# Patient Record
Sex: MALE | Race: WHITE | Employment: FULL TIME | URBAN - METROPOLITAN AREA
[De-identification: names, ages, dates, MRNs, and addresses within clinical notes are randomized per-mention and may not be internally consistent; named-entity substitution may affect disease eponyms.]

---

## 2017-08-10 ENCOUNTER — OFFICE VISIT (OUTPATIENT)
Dept: INTERNAL MEDICINE CLINIC | Age: 37
End: 2017-08-10

## 2017-08-10 VITALS
OXYGEN SATURATION: 97 % | HEIGHT: 70 IN | RESPIRATION RATE: 18 BRPM | SYSTOLIC BLOOD PRESSURE: 120 MMHG | WEIGHT: 255 LBS | DIASTOLIC BLOOD PRESSURE: 74 MMHG | BODY MASS INDEX: 36.51 KG/M2 | TEMPERATURE: 98 F | HEART RATE: 77 BPM

## 2017-08-10 DIAGNOSIS — F98.8 ADD (ATTENTION DEFICIT DISORDER): Primary | ICD-10-CM

## 2017-08-10 RX ORDER — LOSARTAN POTASSIUM 100 MG/1
100 TABLET ORAL
COMMUNITY
End: 2017-09-06 | Stop reason: SDUPTHER

## 2017-08-10 RX ORDER — METHYLPHENIDATE HYDROCHLORIDE 10 MG/1
10 TABLET ORAL
COMMUNITY
End: 2017-08-10 | Stop reason: SDUPTHER

## 2017-08-10 RX ORDER — METHYLPHENIDATE HYDROCHLORIDE 10 MG/1
10 TABLET ORAL 2 TIMES DAILY
Qty: 60 TAB | Refills: 0 | Status: SHIPPED | OUTPATIENT
Start: 2017-08-10 | End: 2017-09-13 | Stop reason: SDUPTHER

## 2017-08-10 RX ORDER — OMEPRAZOLE 10 MG/1
10 CAPSULE, DELAYED RELEASE ORAL
COMMUNITY
End: 2018-02-02 | Stop reason: SDUPTHER

## 2017-08-10 NOTE — PROGRESS NOTES
Chief Complaint   Patient presents with   [facility] Establish Care    Medication Evaluation    Attention Deficit Disorder    Hypertension

## 2017-08-10 NOTE — MR AVS SNAPSHOT
Visit Information Date & Time Provider Department Dept. Phone Encounter #  
 8/10/2017  3:30 PM Tam Bustos PA-C [facility] [phone] [id number] Follow-up Instructions Return in about 4 weeks (around 9/7/2017) for ADD. Upcoming Health Maintenance Date Due DTaP/Tdap/Td series (1 - Tdap) 6/6/2003 INFLUENZA AGE 9 TO ADULT 8/1/2017 Allergies as of 8/10/2017  Review Complete On: 8/10/2017 By: Key Ceballos LPN No Known Allergies Current Immunizations  Never Reviewed No immunizations on file. Not reviewed this visit You Were Diagnosed With   
  
 Codes Comments ADD (attention deficit disorder)    -  Primary ICD-10-CM: F98.8 ICD-9-CM: 314.00 Vitals BP Pulse Temp Resp Height(growth percentile) Weight(growth percentile) 120/74 (BP 1 Location: Left arm, BP Patient Position: Sitting) 77 98 °F (36.7 °C) (Oral) 18 5' 10\" (1.778 m) 255 lb (115.7 kg) SpO2 BMI Smoking Status 97% 36.59 kg/m2 Never Smoker BMI and BSA Data Body Mass Index Body Surface Area  
 36.59 kg/m 2 2.39 m 2 Your Updated Medication List  
  
   
This list is accurate as of: 8/10/17 11:59 PM.  Always use your most recent med list.  
  
  
  
  
 losartan 100 mg tablet Commonly known as:  COZAAR  
100 mg.  
  
 methylphenidate HCl 10 mg tablet Commonly known as:  RITALIN Take 1 Tab by mouth two (2) times a day. Max Daily Amount: 20 mg.  
  
 omeprazole 10 mg capsule Commonly known as:  PRILOSEC 10 mg.  
  
  
  
  
Prescriptions Printed Refills  
 methylphenidate HCl (RITALIN) 10 mg tablet 0 Sig: Take 1 Tab by mouth two (2) times a day. Max Daily Amount: 20 mg.  
 Class: Print Route: Oral  
  
Follow-up Instructions Return in about 4 weeks (around 9/7/2017) for ADD. Patient Instructions Attention Deficit Hyperactivity Disorder (ADHD) in Adults: Care Instructions Your Care Instructions Attention deficit hyperactivity disorder, or ADHD, is a condition that makes it hard to pay attention. So you may have problems when you try to focus, get organized, and finish tasks. It might make you more active than other people. Or you might do things without thinking first. 
ADHD is very common. It usually starts in early childhood. Many adults don't realize they have it until their children are diagnosed. Then they become aware of their own symptoms. Doctors don't know what causes ADHD. But it often runs in families. ADHD can be treated with medicines, behavior training, and counseling. Treatment can improve your life. Follow-up care is a key part of your treatment and safety. Be sure to make and go to all appointments, and call your doctor if you are having problems. It's also a good idea to know your test results and keep a list of the medicines you take. How can you care for yourself at home? · Learn all you can about ADHD. This will help you and your family understand it better. · Take your medicines exactly as prescribed. Call your doctor if you think you are having a problem with your medicine. You will get more details on the specific medicines your doctor prescribes. · If you miss a dose of your medicine, do not take an extra dose. · If your doctor suggests counseling, find a counselor you like and trust. Talk openly and honestly. Be willing to make some changes. · Find a support group for adults with ADHD. Talking to others with the same problems can help you feel better. It can also give you ideas about how to best cope with the condition. · Get rid of distractions at your work space. Keep your desk clean. Try not to face a window or busy hallway. · Use files, planners, and other tools to keep you organized. · Limit use of alcohol, and do not use illegal drugs. People with ADHD tend to become addicted more easily than others.  Tell your doctor if you need help to quit. Counseling, support groups, and sometimes medicines can help you stay free of alcohol or drugs. · Get at least 30 minutes of physical activity on most days of the week. Exercise has been shown to help people cope with ADHD. Walking is a good choice. You also may want to do other activities, such as running, swimming, cycling, or playing tennis or team sports. When should you call for help? Watch closely for changes in your health, and be sure to contact your doctor if: 
· You feel sad a lot or cry all the time. · You have trouble sleeping, or you sleep too much. · You find it hard to concentrate, make decisions, or remember things. · You change how you normally eat. · You feel guilty for no reason. Where can you learn more? Go to http://tay-efrain.info/. Enter B196 in the search box to learn more about \"Attention Deficit Hyperactivity Disorder (ADHD) in Adults: Care Instructions. \" Current as of: July 26, 2016 Content Version: 11.3 © 1841-5713 [employer]. Care instructions adapted under license by [employer] (which disclaims liability or warranty for this information). If you have questions about a medical condition or this instruction, always ask your healthcare professional. [address] any warranty or liability for your use of this information. Introducing [facility]! Kimberlyn Melo introduces [facility] patient portal. Now you can access parts of your medical record, email your doctor's office, and request medication refills online. 1. In your internet browser, go to [email or web address]. [employer] 2. Click on the First Time User? Click Here link in the Sign In box. You will see the New Member Sign Up page. 3. Enter your [facility] Access Code exactly as it appears below. You will not need to use this code after youve completed the sign-up process.  If you do not sign up before the expiration date, you must request a new code. · Mob.ly Access Code: [email or web address] Expires: 11/8/2017  5:09 PM 
 
4. Enter the last four digits of your Social Security Number (xxxx) and Date of Birth (mm/dd/yyyy) as indicated and click Submit. You will be taken to the next sign-up page. 5. Create a Mob.ly ID. This will be your Mob.ly login ID and cannot be changed, so think of one that is secure and easy to remember. 6. Create a Mob.ly password. You can change your password at any time. 7. Enter your Password Reset Question and Answer. This can be used at a later time if you forget your password. 8. Enter your e-mail address. You will receive e-mail notification when new information is available in [address] 9. Click Sign Up. You can now view and download portions of your medical record. 10. Click the Download Summary menu link to download a portable copy of your medical information. If you have questions, please visit the Frequently Asked Questions section of the Mob.ly website. Remember, Mob.ly is NOT to be used for urgent needs. For medical emergencies, dial 911. Now available from your iPhone and Android! Please provide this summary of care documentation to your next provider. Your primary care clinician is listed as Frances Nguyễn. If you have any questions after today's visit, please call [phone].

## 2017-08-11 NOTE — PROGRESS NOTES
HISTORY OF PRESENT ILLNESS  Brian Carias is a 30 y.o. male. HPI   Pt is here to establish care. He moved here from [place] to help care for his mom. He currently has no insurance because he has not found a job yet. He was on ritalin 10mg bid for many years and is asking for a refill. He will have previous provider note along with labs sent to us. Will give pt 1 month and then advised him will need to see records or reevaluate. Pt agrees. No Known Allergies    Current Outpatient Prescriptions   Medication Sig    omeprazole (PRILOSEC) 10 mg capsule 10 mg.    losartan (COZAAR) 100 mg tablet 100 mg.    methylphenidate HCl (RITALIN) 10 mg tablet Take 1 Tab by mouth two (2) times a day. Max Daily Amount: 20 mg. No current facility-administered medications for this visit. Review of Systems   Constitutional: Negative. Negative for chills, fever and malaise/fatigue. HENT: Negative. Negative for congestion, ear pain, sore throat and tinnitus. Eyes: Negative. Negative for blurred vision, double vision and photophobia. Respiratory: Negative. Negative for cough, shortness of breath and wheezing. Cardiovascular: Negative. Negative for chest pain, palpitations and leg swelling. Gastrointestinal: Negative. Negative for abdominal pain, heartburn, nausea and vomiting. Genitourinary: Negative. Negative for dysuria, frequency, hematuria and urgency. Musculoskeletal: Negative. Negative for back pain, joint pain, myalgias and neck pain. Skin: Negative. Negative for itching and rash. Neurological: Negative. Negative for dizziness, tingling, tremors and headaches. Psychiatric/Behavioral: Negative. Negative for depression and memory loss. The patient is not nervous/anxious and does not have insomnia.       Visit Vitals    /74 (BP 1 Location: Left arm, BP Patient Position: Sitting)    Pulse 77    Temp 98 °F (36.7 °C) (Oral)    Resp 18    Ht 5' 10\" (1.778 m)    Wt 255 lb (115.7 kg)  SpO2 97%    BMI 36.59 kg/m2       Physical Exam   Constitutional: He is oriented to person, place, and time. He appears well-developed and well-nourished. No distress. HENT:   Head: Normocephalic and atraumatic. Eyes: Pupils are equal, round, and reactive to light. Cardiovascular: Normal rate, regular rhythm and normal heart sounds. Exam reveals no gallop and no friction rub. No murmur heard. Pulmonary/Chest: Effort normal and breath sounds normal. No respiratory distress. He has no wheezes. He has no rales. Neurological: He is alert and oriented to person, place, and time. Skin: Skin is warm and dry. He is not diaphoretic. Psychiatric: He has a normal mood and affect. His behavior is normal.       ASSESSMENT and PLAN    ICD-10-CM ICD-9-CM    1. ADD (attention deficit disorder) F98.8 314.00 methylphenidate HCl (RITALIN) 10 mg tablet     Follow-up Disposition:  Return in about 4 weeks (around 9/7/2017) for ADD. Pt expressed understanding of visit summary and plans for any follow ups or referrals as well as any medications prescribed.

## 2017-08-11 NOTE — PATIENT INSTRUCTIONS
Attention Deficit Hyperactivity Disorder (ADHD) in Adults: Care Instructions  Your Care Instructions  Attention deficit hyperactivity disorder, or ADHD, is a condition that makes it hard to pay attention. So you may have problems when you try to focus, get organized, and finish tasks. It might make you more active than other people. Or you might do things without thinking first.  ADHD is very common. It usually starts in early childhood. Many adults don't realize they have it until their children are diagnosed. Then they become aware of their own symptoms. Doctors don't know what causes ADHD. But it often runs in families. ADHD can be treated with medicines, behavior training, and counseling. Treatment can improve your life. Follow-up care is a key part of your treatment and safety. Be sure to make and go to all appointments, and call your doctor if you are having problems. It's also a good idea to know your test results and keep a list of the medicines you take. How can you care for yourself at home? · Learn all you can about ADHD. This will help you and your family understand it better. · Take your medicines exactly as prescribed. Call your doctor if you think you are having a problem with your medicine. You will get more details on the specific medicines your doctor prescribes. · If you miss a dose of your medicine, do not take an extra dose. · If your doctor suggests counseling, find a counselor you like and trust. Talk openly and honestly. Be willing to make some changes. · Find a support group for adults with ADHD. Talking to others with the same problems can help you feel better. It can also give you ideas about how to best cope with the condition. · Get rid of distractions at your work space. Keep your desk clean. Try not to face a window or busy hallway. · Use files, planners, and other tools to keep you organized. · Limit use of alcohol, and do not use illegal drugs.  People with ADHD tend to become addicted more easily than others. Tell your doctor if you need help to quit. Counseling, support groups, and sometimes medicines can help you stay free of alcohol or drugs. · Get at least 30 minutes of physical activity on most days of the week. Exercise has been shown to help people cope with ADHD. Walking is a good choice. You also may want to do other activities, such as running, swimming, cycling, or playing tennis or team sports. When should you call for help? Watch closely for changes in your health, and be sure to contact your doctor if:  · You feel sad a lot or cry all the time. · You have trouble sleeping, or you sleep too much. · You find it hard to concentrate, make decisions, or remember things. · You change how you normally eat. · You feel guilty for no reason. Where can you learn more? Go to http://tay-efrain.info/. Enter B196 in the search box to learn more about \"Attention Deficit Hyperactivity Disorder (ADHD) in Adults: Care Instructions. \"  Current as of: July 26, 2016  Content Version: 11.3  © 9065-3836 [employer]. Care instructions adapted under license by [employer] (which disclaims liability or warranty for this information). If you have questions about a medical condition or this instruction, always ask your healthcare professional. [address] any warranty or liability for your use of this information.

## 2017-09-06 RX ORDER — LOSARTAN POTASSIUM 100 MG/1
100 TABLET ORAL DAILY
Qty: 30 TAB | Refills: 2 | Status: SHIPPED | OUTPATIENT
Start: 2017-09-06 | End: 2017-12-01 | Stop reason: SDUPTHER

## 2017-09-13 ENCOUNTER — OFFICE VISIT (OUTPATIENT)
Dept: INTERNAL MEDICINE CLINIC | Age: 37
End: 2017-09-13

## 2017-09-13 VITALS
HEART RATE: 76 BPM | WEIGHT: 250 LBS | BODY MASS INDEX: 35.79 KG/M2 | RESPIRATION RATE: 18 BRPM | SYSTOLIC BLOOD PRESSURE: 136 MMHG | OXYGEN SATURATION: 98 % | DIASTOLIC BLOOD PRESSURE: 84 MMHG | TEMPERATURE: 98 F | HEIGHT: 70 IN

## 2017-09-13 DIAGNOSIS — F98.8 ADD (ATTENTION DEFICIT DISORDER): ICD-10-CM

## 2017-09-13 RX ORDER — METHYLPHENIDATE HYDROCHLORIDE 10 MG/1
10 TABLET ORAL 2 TIMES DAILY
Qty: 60 TAB | Refills: 0 | Status: SHIPPED | OUTPATIENT
Start: 2017-09-13 | End: 2017-10-10 | Stop reason: SDUPTHER

## 2017-09-13 RX ORDER — METHYLPHENIDATE HYDROCHLORIDE 10 MG/1
10 TABLET ORAL 2 TIMES DAILY
Qty: 60 TAB | Refills: 0 | Status: CANCELLED | OUTPATIENT
Start: 2017-09-13

## 2017-09-13 NOTE — TELEPHONE ENCOUNTER
Will refill for 1 month to allow pt to get insurance but must be seen for any more. Can you also check his BP before we give him script?

## 2017-10-10 ENCOUNTER — OFFICE VISIT (OUTPATIENT)
Dept: INTERNAL MEDICINE CLINIC | Age: 37
End: 2017-10-10

## 2017-10-10 VITALS
RESPIRATION RATE: 18 BRPM | DIASTOLIC BLOOD PRESSURE: 88 MMHG | TEMPERATURE: 98.4 F | HEART RATE: 70 BPM | SYSTOLIC BLOOD PRESSURE: 133 MMHG | WEIGHT: 256 LBS | OXYGEN SATURATION: 98 % | BODY MASS INDEX: 36.65 KG/M2 | HEIGHT: 70 IN

## 2017-10-10 DIAGNOSIS — F98.8 ATTENTION DEFICIT DISORDER (ADD) WITHOUT HYPERACTIVITY: Primary | ICD-10-CM

## 2017-10-10 DIAGNOSIS — F51.04 PSYCHOPHYSIOLOGICAL INSOMNIA: ICD-10-CM

## 2017-10-10 RX ORDER — METHYLPHENIDATE HYDROCHLORIDE 10 MG/1
10 TABLET ORAL 3 TIMES DAILY
Qty: 90 TAB | Refills: 0 | Status: SHIPPED | OUTPATIENT
Start: 2017-10-10 | End: 2017-12-26 | Stop reason: SDUPTHER

## 2017-10-10 RX ORDER — TEMAZEPAM 7.5 MG/1
7.5 CAPSULE ORAL
Qty: 30 CAP | Refills: 0 | Status: SHIPPED | OUTPATIENT
Start: 2017-10-10 | End: 2017-12-26 | Stop reason: SDUPTHER

## 2017-10-10 RX ORDER — METHYLPHENIDATE HYDROCHLORIDE 10 MG/1
10 TABLET ORAL 3 TIMES DAILY
Qty: 90 TAB | Refills: 0 | Status: SHIPPED | OUTPATIENT
Start: 2017-11-07 | End: 2017-12-26 | Stop reason: SDUPTHER

## 2017-10-10 NOTE — PATIENT INSTRUCTIONS
Attention Deficit Hyperactivity Disorder (ADHD) in Adults: Care Instructions  Your Care Instructions  Attention deficit hyperactivity disorder, or ADHD, is a condition that makes it hard to pay attention. So you may have problems when you try to focus, get organized, and finish tasks. It might make you more active than other people. Or you might do things without thinking first.  ADHD is very common. It usually starts in early childhood. Many adults don't realize they have it until their children are diagnosed. Then they become aware of their own symptoms. Doctors don't know what causes ADHD. But it often runs in families. ADHD can be treated with medicines, behavior training, and counseling. Treatment can improve your life. Follow-up care is a key part of your treatment and safety. Be sure to make and go to all appointments, and call your doctor if you are having problems. It's also a good idea to know your test results and keep a list of the medicines you take. How can you care for yourself at home? · Learn all you can about ADHD. This will help you and your family understand it better. · Take your medicines exactly as prescribed. Call your doctor if you think you are having a problem with your medicine. You will get more details on the specific medicines your doctor prescribes. · If you miss a dose of your medicine, do not take an extra dose. · If your doctor suggests counseling, find a counselor you like and trust. Talk openly and honestly. Be willing to make some changes. · Find a support group for adults with ADHD. Talking to others with the same problems can help you feel better. It can also give you ideas about how to best cope with the condition. · Get rid of distractions at your work space. Keep your desk clean. Try not to face a window or busy hallway. · Use files, planners, and other tools to keep you organized. · Limit use of alcohol, and do not use illegal drugs.  People with ADHD tend to become addicted more easily than others. Tell your doctor if you need help to quit. Counseling, support groups, and sometimes medicines can help you stay free of alcohol or drugs. · Get at least 30 minutes of physical activity on most days of the week. Exercise has been shown to help people cope with ADHD. Walking is a good choice. You also may want to do other activities, such as running, swimming, cycling, or playing tennis or team sports. When should you call for help? Watch closely for changes in your health, and be sure to contact your doctor if:  · You feel sad a lot or cry all the time. · You have trouble sleeping, or you sleep too much. · You find it hard to concentrate, make decisions, or remember things. · You change how you normally eat. · You feel guilty for no reason. Where can you learn more? Go to http://tay-efrain.info/. Enter B196 in the search box to learn more about \"Attention Deficit Hyperactivity Disorder (ADHD) in Adults: Care Instructions. \"  Current as of: July 26, 2016  Content Version: 11.3  © 9305-9802 [employer]. Care instructions adapted under license by [employer] (which disclaims liability or warranty for this information). If you have questions about a medical condition or this instruction, always ask your healthcare professional. [address] any warranty or liability for your use of this information.

## 2017-10-10 NOTE — PROGRESS NOTES
Chief Complaint   Patient presents with    Attention Deficit Disorder     Wants to discuss medication   1. Have you been to the ER, urgent care clinic since your last visit? Hospitalized since your last visit? No    2. Have you seen or consulted any other health care providers outside of the [address] [place] [address] since your last visit? Include any pap smears or colon screening.  No

## 2017-10-10 NOTE — PROGRESS NOTES
HISTORY OF PRESENT ILLNESS  Venice Zamora is a 30 y.o. male. HPI  Pt is here for f/u on his ritalin. Pt states it is not lasting through the day jarek now that he is having to study after work. Spoke with pt about going to 3x a day rather than increasing dose. Pt denies HA, CP, palpitations, and increased anxiety. Pt is stressed and occassionally has trouble sleeping due to the stress over helping his mom care for his sister who has cerebral palsy. Pt left 2nd ritalin script in office and will  when he is due. No Known Allergies    Current Outpatient Prescriptions   Medication Sig    methylphenidate HCl (RITALIN) 10 mg tablet Take 1 Tab by mouth three (3) times daily. Max Daily Amount: 30 mg.  temazepam (RESTORIL) 7.5 mg capsule Take 1 Cap by mouth nightly as needed for Sleep. Max Daily Amount: 7.5 mg.    [START ON 11/7/2017] methylphenidate HCl (RITALIN) 10 mg tablet Take 1 Tab by mouth three (3) times daily. Max Daily Amount: 30 mg.    losartan (COZAAR) 100 mg tablet Take 1 Tab by mouth daily.  omeprazole (PRILOSEC) 10 mg capsule 10 mg. No current facility-administered medications for this visit. Review of Systems   Constitutional: Negative. Negative for chills, fever and malaise/fatigue. HENT: Negative. Negative for congestion, ear pain, sore throat and tinnitus. Eyes: Negative. Negative for blurred vision, double vision and photophobia. Respiratory: Negative. Negative for cough, shortness of breath and wheezing. Cardiovascular: Negative. Negative for chest pain, palpitations and leg swelling. Gastrointestinal: Negative. Negative for abdominal pain, heartburn, nausea and vomiting. Genitourinary: Negative. Negative for dysuria, frequency, hematuria and urgency. Musculoskeletal: Negative. Negative for back pain, joint pain, myalgias and neck pain. Skin: Negative. Negative for itching and rash. Neurological: Negative.   Negative for dizziness, tingling, tremors and headaches. Psychiatric/Behavioral: Negative. Negative for depression and memory loss. The patient is not nervous/anxious and does not have insomnia. Visit Vitals    /88    Pulse 70    Temp 98.4 °F (36.9 °C) (Oral)    Resp 18    Ht 5' 10\" (1.778 m)    Wt 256 lb (116.1 kg)    SpO2 98%    BMI 36.73 kg/m2       Physical Exam   Constitutional: He is oriented to person, place, and time. He appears well-developed and well-nourished. No distress. HENT:   Head: Normocephalic and atraumatic. Eyes: Pupils are equal, round, and reactive to light. Cardiovascular: Normal rate, regular rhythm and normal heart sounds. Exam reveals no gallop and no friction rub. No murmur heard. Pulmonary/Chest: Effort normal and breath sounds normal. No respiratory distress. He has no wheezes. He has no rales. Neurological: He is alert and oriented to person, place, and time. Skin: Skin is warm and dry. He is not diaphoretic. Psychiatric: He has a normal mood and affect. His behavior is normal.       ASSESSMENT and PLAN    ICD-10-CM ICD-9-CM    1. Attention deficit disorder (ADD) without hyperactivity F98.8 314.00 methylphenidate HCl (RITALIN) 10 mg tablet      methylphenidate HCl (RITALIN) 10 mg tablet   2. Psychophysiological insomnia F51.04 307.42 temazepam (RESTORIL) 7.5 mg capsule     Follow-up Disposition:  Return in about 2 months (around 12/10/2017) for ADD and insomnia. Pt expressed understanding of visit summary and plans for any follow ups or referrals as well as any medications prescribed.

## 2017-10-10 NOTE — MR AVS SNAPSHOT
Visit Information Date & Time Provider Department Dept. Phone Encounter #  
 10/10/2017  8:45 AM Mark Thornton PA-C [facility] [phone] [id number] Follow-up Instructions Return in about 2 months (around 12/10/2017) for ADD and insomnia. Upcoming Health Maintenance Date Due DTaP/Tdap/Td series (1 - Tdap) 6/6/2003 INFLUENZA AGE 9 TO ADULT 10/27/2017* *Topic was postponed. The date shown is not the original due date. Allergies as of 10/10/2017  Review Complete On: 10/10/2017 By: Mark Thornton PA-C No Known Allergies Current Immunizations  Never Reviewed No immunizations on file. Not reviewed this visit You Were Diagnosed With   
  
 Codes Comments Attention deficit disorder (ADD) without hyperactivity    -  Primary ICD-10-CM: F98.8 ICD-9-CM: 314.00 Psychophysiological insomnia     ICD-10-CM: F51.04 
ICD-9-CM: 307.42 Vitals BP Pulse Temp Resp Height(growth percentile) Weight(growth percentile) 133/88 70 98.4 °F (36.9 °C) (Oral) 18 5' 10\" (1.778 m) 256 lb (116.1 kg) SpO2 BMI Smoking Status 98% 36.73 kg/m2 Never Smoker Vitals History BMI and BSA Data Body Mass Index Body Surface Area  
 36.73 kg/m 2 2.39 m 2 Preferred Pharmacy [facility] Name Phone [facility] [address] [phone] Your Updated Medication List  
  
   
This list is accurate as of: 10/10/17 10:50 AM.  Always use your most recent med list.  
  
  
  
  
 losartan 100 mg tablet Commonly known as:  COZAAR Take 1 Tab by mouth daily. * methylphenidate HCl 10 mg tablet Commonly known as:  RITALIN Take 1 Tab by mouth three (3) times daily. Max Daily Amount: 30 mg.  
  
 * methylphenidate HCl 10 mg tablet Commonly known as:  RITALIN Take 1 Tab by mouth three (3) times daily. Max Daily Amount: 30 mg. Start taking on:  11/7/2017 omeprazole 10 mg capsule Commonly known as:  PRILOSEC 10 mg.  
  
 temazepam 7.5 mg capsule Commonly known as:  RESTORIL Take 1 Cap by mouth nightly as needed for Sleep. Max Daily Amount: 7.5 mg.  
  
 * Notice: This list has 2 medication(s) that are the same as other medications prescribed for you. Read the directions carefully, and ask your doctor or other care provider to review them with you. Prescriptions Printed Refills  
 methylphenidate HCl (RITALIN) 10 mg tablet 0 Sig: Take 1 Tab by mouth three (3) times daily. Max Daily Amount: 30 mg.  
 Class: Print Route: Oral  
 temazepam (RESTORIL) 7.5 mg capsule 0 Sig: Take 1 Cap by mouth nightly as needed for Sleep. Max Daily Amount: 7.5 mg.  
 Class: Print Route: Oral  
 methylphenidate HCl (RITALIN) 10 mg tablet 0 Starting on: 11/7/2017 Sig: Take 1 Tab by mouth three (3) times daily. Max Daily Amount: 30 mg.  
 Class: Print Route: Oral  
  
Follow-up Instructions Return in about 2 months (around 12/10/2017) for ADD and insomnia. Patient Instructions Attention Deficit Hyperactivity Disorder (ADHD) in Adults: Care Instructions Your Care Instructions Attention deficit hyperactivity disorder, or ADHD, is a condition that makes it hard to pay attention. So you may have problems when you try to focus, get organized, and finish tasks. It might make you more active than other people. Or you might do things without thinking first. 
ADHD is very common. It usually starts in early childhood. Many adults don't realize they have it until their children are diagnosed. Then they become aware of their own symptoms. Doctors don't know what causes ADHD. But it often runs in families. ADHD can be treated with medicines, behavior training, and counseling. Treatment can improve your life. Follow-up care is a key part of your treatment and safety.  Be sure to make and go to all appointments, and call your doctor if you are having problems. It's also a good idea to know your test results and keep a list of the medicines you take. How can you care for yourself at home? · Learn all you can about ADHD. This will help you and your family understand it better. · Take your medicines exactly as prescribed. Call your doctor if you think you are having a problem with your medicine. You will get more details on the specific medicines your doctor prescribes. · If you miss a dose of your medicine, do not take an extra dose. · If your doctor suggests counseling, find a counselor you like and trust. Talk openly and honestly. Be willing to make some changes. · Find a support group for adults with ADHD. Talking to others with the same problems can help you feel better. It can also give you ideas about how to best cope with the condition. · Get rid of distractions at your work space. Keep your desk clean. Try not to face a window or busy hallway. · Use files, planners, and other tools to keep you organized. · Limit use of alcohol, and do not use illegal drugs. People with ADHD tend to become addicted more easily than others. Tell your doctor if you need help to quit. Counseling, support groups, and sometimes medicines can help you stay free of alcohol or drugs. · Get at least 30 minutes of physical activity on most days of the week. Exercise has been shown to help people cope with ADHD. Walking is a good choice. You also may want to do other activities, such as running, swimming, cycling, or playing tennis or team sports. When should you call for help? Watch closely for changes in your health, and be sure to contact your doctor if: 
· You feel sad a lot or cry all the time. · You have trouble sleeping, or you sleep too much. · You find it hard to concentrate, make decisions, or remember things. · You change how you normally eat. · You feel guilty for no reason. Where can you learn more? Go to http://tay-efrain.info/. Enter B196 in the search box to learn more about \"Attention Deficit Hyperactivity Disorder (ADHD) in Adults: Care Instructions. \" Current as of: July 26, 2016 Content Version: 11.3 © 3428-5077 [employer]. Care instructions adapted under license by [employer] (which disclaims liability or warranty for this information). If you have questions about a medical condition or this instruction, always ask your healthcare professional. [address] any warranty or liability for your use of this information. Introducing [facility]! Neena Gallegos introduces [facility] patient portal. Now you can access parts of your medical record, email your doctor's office, and request medication refills online. 1. In your internet browser, go to [email or web address] 2. Click on the First Time User? Click Here link in the Sign In box. You will see the New Member Sign Up page. 3. Enter your [facility] Access Code exactly as it appears below. You will not need to use this code after youve completed the sign-up process. If you do not sign up before the expiration date, you must request a new code. · [facility] Access Code: [email or web address] Expires: 11/8/2017  5:09 PM 
 
4. Enter the last four digits of your Social Security Number (xxxx) and Date of Birth (mm/dd/yyyy) as indicated and click Submit. You will be taken to the next sign-up page. 5. Create a [place] [employer] ID. This will be your [facility] login ID and cannot be changed, so think of one that is secure and easy to remember. 6. Create a [facility] password. You can change your password at any time. 7. Enter your Password Reset Question and Answer. This can be used at a later time if you forget your password. 8. Enter your e-mail address. You will receive e-mail notification when new information is available in [address] 9. Click Sign Up. You can now view and download portions of your medical record. 10. Click the Download Summary menu link to download a portable copy of your medical information. If you have questions, please visit the Frequently Asked Questions section of the [employer] website. Remember, [employer] is NOT to be used for urgent needs. For medical emergencies, dial 911. Now available from your iPhone and Android! Please provide this summary of care documentation to your next provider. Your primary care clinician is listed as Fern Nguyễn. If you have any questions after today's visit, please call [phone].

## 2017-11-08 RX ORDER — OMEPRAZOLE 10 MG/1
10 CAPSULE, DELAYED RELEASE ORAL
Status: CANCELLED | OUTPATIENT
Start: 2017-11-08

## 2017-11-08 NOTE — TELEPHONE ENCOUNTER
Mr Starla Messer called for refill of omeprazole (PRILOSEC) 10 mg capsule. This is listed under Meds Tab as Historical Provider/[facility]. Last OV 10/10/17.

## 2017-11-10 NOTE — TELEPHONE ENCOUNTER
LVM for patient to call back about a question on a medication. We need to get clairification for [facility] about strength of his prilosec. Is it really 10mg?

## 2017-11-15 ENCOUNTER — OFFICE VISIT (OUTPATIENT)
Dept: INTERNAL MEDICINE CLINIC | Age: 37
End: 2017-11-15

## 2017-11-15 VITALS
DIASTOLIC BLOOD PRESSURE: 86 MMHG | HEIGHT: 70 IN | WEIGHT: 266 LBS | TEMPERATURE: 98.2 F | OXYGEN SATURATION: 97 % | SYSTOLIC BLOOD PRESSURE: 147 MMHG | HEART RATE: 70 BPM | RESPIRATION RATE: 18 BRPM | BODY MASS INDEX: 38.08 KG/M2

## 2017-11-15 DIAGNOSIS — J01.00 ACUTE MAXILLARY SINUSITIS, RECURRENCE NOT SPECIFIED: ICD-10-CM

## 2017-11-15 DIAGNOSIS — L03.115 CELLULITIS OF RIGHT LOWER EXTREMITY: Primary | ICD-10-CM

## 2017-11-15 RX ORDER — CEPHALEXIN 500 MG/1
500 CAPSULE ORAL 4 TIMES DAILY
Qty: 40 CAP | Refills: 0 | Status: SHIPPED | OUTPATIENT
Start: 2017-11-15 | End: 2017-12-26 | Stop reason: SDUPTHER

## 2017-11-15 NOTE — MR AVS SNAPSHOT
Visit Information Date & Time Provider Department Dept. Phone Encounter #  
 11/15/2017  8:30 AM Manfred Severs, MD [facility] [phone] [id number] Follow-up Instructions Return in about 1 week (around 11/22/2017) for follow up of cellulitis. Upcoming Health Maintenance Date Due DTaP/Tdap/Td series (1 - Tdap) 6/6/2003 Influenza Age 5 to Adult 8/1/2017 Allergies as of 11/15/2017  Review Complete On: 11/15/2017 By: Roma Hoffmann LPN No Known Allergies Current Immunizations  Never Reviewed No immunizations on file. Not reviewed this visit You Were Diagnosed With   
  
 Codes Comments Cellulitis of right lower extremity    -  Primary ICD-10-CM: K60.704 ICD-9-CM: 300. 6 Vitals BP Pulse Temp Resp Height(growth percentile) Weight(growth percentile) 147/86 70 98.2 °F (36.8 °C) (Oral) 18 5' 10\" (1.778 m) 266 lb (120.7 kg) SpO2 BMI Smoking Status 97% 38.17 kg/m2 Never Smoker Vitals History BMI and BSA Data Body Mass Index Body Surface Area  
 38.17 kg/m 2 2.44 m 2 Preferred Pharmacy Pharmacy Name Phone [phone] [facility] [phone] Your Updated Medication List  
  
   
This list is accurate as of: 11/15/17  8:53 AM.  Always use your most recent med list.  
  
  
  
  
 cephALEXin 500 mg capsule Commonly known as:  Geena Hides Take 1 Cap by mouth four (4) times daily for 10 days. losartan 100 mg tablet Commonly known as:  COZAAR Take 1 Tab by mouth daily. * methylphenidate HCl 10 mg tablet Commonly known as:  RITALIN Take 1 Tab by mouth three (3) times daily. Max Daily Amount: 30 mg.  
  
 * methylphenidate HCl 10 mg tablet Commonly known as:  RITALIN Take 1 Tab by mouth three (3) times daily. Max Daily Amount: 30 mg.  
  
 omeprazole 10 mg capsule Commonly known as:  PRILOSEC 10 mg. temazepam 7.5 mg capsule Commonly known as:  RESTORIL Take 1 Cap by mouth nightly as needed for Sleep. Max Daily Amount: 7.5 mg.  
  
 * Notice: This list has 2 medication(s) that are the same as other medications prescribed for you. Read the directions carefully, and ask your doctor or other care provider to review them with you. Prescriptions Sent to Pharmacy Refills  
 cephALEXin (KEFLEX) 500 mg capsule 0 Sig: Take 1 Cap by mouth four (4) times daily for 10 days. Class: Normal  
 Pharmacy: [facility]/pharmacy #[id number] [facility] [address] [place] [address] #: [phone] Route: Oral  
  
Follow-up Instructions Return in about 1 week (around 11/22/2017) for follow up of cellulitis. Introducing [facility]! Lianna Wilkinson introduces [facility] patient portal. Now you can access parts of your medical record, email your doctor's office, and request medication refills online. 1. In your internet browser, go to [email or web address] 2. Click on the First Time User? Click Here link in the Sign In box. You will see the New Member Sign Up page. 3. Enter your [facility] Access Code exactly as it appears below. You will not need to use this code after youve completed the sign-up process. If you do not sign up before the expiration date, you must request a new code. · [facility] Access Code: [email or web address] Expires: 2/13/2018  8:53 AM 
 
4. Enter the last four digits of your Social Security Number (xxxx) and Date of Birth (mm/dd/yyyy) as indicated and click Submit. You will be taken to the next sign-up page. 5. Create a [facility] ID. This will be your [facility] login ID and cannot be changed, so think of one that is secure and easy to remember. 6. Create a [facility] password. You can change your password at any time. 7. Enter your Password Reset Question and Answer. This can be used at a later time if you forget your password. 8. Enter your e-mail address. You will receive e-mail notification when new information is available in [address] 9. Click Sign Up. You can now view and download portions of your medical record. 10. Click the Download Summary menu link to download a portable copy of your medical information. If you have questions, please visit the Frequently Asked Questions section of the [employer] website. Remember, [employer] is NOT to be used for urgent needs. For medical emergencies, dial 911. Now available from your iPhone and Android! Please provide this summary of care documentation to your next provider. Your primary care clinician is listed as Fern Nguyễn. If you have any questions after today's visit, please call [phone].

## 2017-11-15 NOTE — PROGRESS NOTES
Subjective:   Marina Watson is a 30 y.o.  male who presents for Sinus Pain    HPI: Pt reports a 3 day history of facial pressure/pain below his eyes. He denies fever/chills, purulent nasal drainage, n/v, cough, or sore throat. He has not attempted to treat his symptoms. He also c/o of redness and discomfort of lateral right lower leg. He reports that the area began as a small red bump that he thought may have been an insect bite. Pt reports \"picking\" at the area which resulted in increased redness and discomfort. He has not been recently hospitalized. He denies purulent drainage. He states that he has experienced what he believes to be similar insect bites of his arms and chest that have resolved without complication. Review of Systems   Constitutional: Negative for chills, fever and malaise/fatigue. HENT: Positive for congestion and sinus pain. Negative for ear discharge, ear pain, hearing loss, nosebleeds, sore throat and tinnitus. Eyes: Negative. Respiratory: Negative. Negative for stridor. Cardiovascular: Negative. Musculoskeletal: Negative. Skin:        See HPI       Current Outpatient Prescriptions on File Prior to Visit   Medication Sig Dispense Refill    methylphenidate HCl (RITALIN) 10 mg tablet Take 1 Tab by mouth three (3) times daily. Max Daily Amount: 30 mg. 90 Tab 0    temazepam (RESTORIL) 7.5 mg capsule Take 1 Cap by mouth nightly as needed for Sleep. Max Daily Amount: 7.5 mg. 30 Cap 0    losartan (COZAAR) 100 mg tablet Take 1 Tab by mouth daily. 30 Tab 2    omeprazole (PRILOSEC) 10 mg capsule 10 mg.      methylphenidate HCl (RITALIN) 10 mg tablet Take 1 Tab by mouth three (3) times daily. Max Daily Amount: 30 mg. 90 Tab 0     No current facility-administered medications on file prior to visit. Reviewed PmHx, RxHx, FmHx, SocHx, AllgHx and updated and dated in the chart.     Nurse notes were reviewed and are correct    Objective:     Vitals:    11/15/17 0825 11/15/17 0828   BP: 159/83 147/86   Pulse: 70 70   Resp: 18    Temp: 98.2 °F (36.8 °C)    TempSrc: Oral    SpO2: 97%    Weight: 266 lb (120.7 kg)    Height: 5' 10\" (1.778 m)      Physical Exam   Constitutional: He is oriented to person, place, and time. He appears well-developed and well-nourished. HENT:   Head: Normocephalic and atraumatic. Nose: Mucosal edema and rhinorrhea present. Right sinus exhibits maxillary sinus tenderness. Right sinus exhibits no frontal sinus tenderness. Left sinus exhibits maxillary sinus tenderness. Left sinus exhibits no frontal sinus tenderness. Eyes: EOM are normal. Pupils are equal, round, and reactive to light. Neck: Normal range of motion. Neck supple. Cardiovascular: Normal rate, regular rhythm, normal heart sounds and intact distal pulses. Pulmonary/Chest: Effort normal and breath sounds normal.   Lymphadenopathy:     He has no cervical adenopathy. Neurological: He is alert and oriented to person, place, and time. Skin: Skin is warm and dry. Blanching erythematous area of right lateral lower extremity approximately 6 cm in diameter with slight warmth, no induration, mildly TTP, no purulent drainage    Nursing note and vitals reviewed. Assessment/ Plan:     Diagnoses and all orders for this visit:    1. Cellulitis of right lower extremity  -     cephALEXin (KEFLEX) 500 mg capsule; Take 1 Cap by mouth four (4) times daily for 10 days.  -     Advised to keep area clean and not to manipulate area  -     Continue to monitor and RTC sooner than scheduled follow up if symptoms become worse  -     May take ibuprofen prn for discomfort    2. Acute maxillary sinusitis, recurrence not specified        -     Suspect viral etiology        -     Start sinus rinse        -     Monitor symptoms        -     RTC if no improvement or worsening symptoms     I have discussed the diagnosis with the patient and the intended plan as seen in the above orders.   The patient verbalized understanding and agrees with the plan.     Follow-up Disposition: Not on [address] [facility], MD

## 2017-11-15 NOTE — PROGRESS NOTES
Chief Complaint   Patient presents with    Sinus Pain   1. Have you been to the ER, urgent care clinic since your last visit? Hospitalized since your last visit? No    2. Have you seen or consulted any other health care providers outside of the [address] [place] [address] since your last visit? Include any pap smears or colon screening.  No

## 2017-12-26 ENCOUNTER — OFFICE VISIT (OUTPATIENT)
Dept: INTERNAL MEDICINE CLINIC | Age: 37
End: 2017-12-26

## 2017-12-26 VITALS
TEMPERATURE: 98 F | BODY MASS INDEX: 36.36 KG/M2 | SYSTOLIC BLOOD PRESSURE: 141 MMHG | DIASTOLIC BLOOD PRESSURE: 89 MMHG | HEIGHT: 70 IN | HEART RATE: 88 BPM | RESPIRATION RATE: 18 BRPM | OXYGEN SATURATION: 96 % | WEIGHT: 254 LBS

## 2017-12-26 DIAGNOSIS — F98.8 ATTENTION DEFICIT DISORDER (ADD) WITHOUT HYPERACTIVITY: ICD-10-CM

## 2017-12-26 DIAGNOSIS — L73.9 FOLLICULITIS: Primary | ICD-10-CM

## 2017-12-26 DIAGNOSIS — F51.04 PSYCHOPHYSIOLOGICAL INSOMNIA: ICD-10-CM

## 2017-12-26 RX ORDER — CEPHALEXIN 500 MG/1
500 CAPSULE ORAL 4 TIMES DAILY
Qty: 40 CAP | Refills: 0 | Status: SHIPPED | OUTPATIENT
Start: 2017-12-26 | End: 2018-01-05

## 2017-12-26 RX ORDER — METHYLPHENIDATE HYDROCHLORIDE 10 MG/1
10 TABLET ORAL 3 TIMES DAILY
Qty: 90 TAB | Refills: 0 | Status: SHIPPED | OUTPATIENT
Start: 2017-12-26 | End: 2018-02-20 | Stop reason: SDUPTHER

## 2017-12-26 RX ORDER — TEMAZEPAM 7.5 MG/1
7.5 CAPSULE ORAL
Qty: 30 CAP | Refills: 0 | Status: SHIPPED | OUTPATIENT
Start: 2017-12-26 | End: 2018-08-20

## 2017-12-26 NOTE — MR AVS SNAPSHOT
Visit Information Date & Time Provider Department Dept. Phone Encounter #  
 12/26/2017 10:00 AM Tunde Hodge MD [facility]  Follow-up Instructions Return in about 3 months (around 3/26/2018) for ADD, insomnia: with Cheri. Upcoming Health Maintenance Date Due DTaP/Tdap/Td series (1 - Tdap) 6/6/2003 Influenza Age 5 to Adult 8/1/2017 Allergies as of 12/26/2017  Review Complete On: 12/26/2017 By: Tunde Hodge MD  
 No Known Allergies Current Immunizations  Never Reviewed No immunizations on file. Not reviewed this visit You Were Diagnosed With   
  
 Codes Comments Folliculitis    -  Primary ICD-10-CM: L73.9 ICD-9-CM: 704.8 Psychophysiological insomnia     ICD-10-CM: F51.04 
ICD-9-CM: 307.42 Attention deficit disorder (ADD) without hyperactivity     ICD-10-CM: F98.8 ICD-9-CM: 314.00 Vitals BP Pulse Temp Resp Height(growth percentile) Weight(growth percentile) 141/89 (BP 1 Location: Left arm, BP Patient Position: Sitting) 88 98 °F (36.7 °C) (Oral) 18 5' 10\" (1.778 m) 254 lb (115.2 kg) SpO2 BMI Smoking Status 96% 36.45 kg/m2 Never Smoker Vitals History BMI and BSA Data Body Mass Index Body Surface Area  
 36.45 kg/m 2 2.39 m 2 Preferred Pharmacy Pharmacy Name Phone [facility] [address] [place] [address] [phone] Your Updated Medication List  
  
   
This list is accurate as of: 12/26/17 10:32 AM.  Always use your most recent med list.  
  
  
  
  
 cephALEXin 500 mg capsule Commonly known as:  [place] Slot Take 1 Cap by mouth four (4) times daily for 10 days. losartan 100 mg tablet Commonly known as:  COZAAR  
TAKE 1 TAB BY MOUTH DAILY. methylphenidate HCl 10 mg tablet Commonly known as:  RITALIN Take 1 Tab by mouth three (3) times daily. Max Daily Amount: 30 mg.  
  
 omeprazole 10 mg capsule Commonly known as:  PRILOSEC 10 mg.  
  
 temazepam 7.5 mg capsule Commonly known as:  RESTORIL Take 1 Cap by mouth nightly as needed for Sleep. Max Daily Amount: 7.5 mg.  
  
  
  
  
Prescriptions Printed Refills  
 temazepam (RESTORIL) 7.5 mg capsule 0 Sig: Take 1 Cap by mouth nightly as needed for Sleep. Max Daily Amount: 7.5 mg.  
 Class: Print Route: Oral  
 methylphenidate HCl (RITALIN) 10 mg tablet 0 Sig: Take 1 Tab by mouth three (3) times daily. Max Daily Amount: 30 mg.  
 Class: Print Route: Oral  
  
Prescriptions Sent to Pharmacy Refills  
 cephALEXin (KEFLEX) 500 mg capsule 0 Sig: Take 1 Cap by mouth four (4) times daily for 10 days. Class: Normal  
 Pharmacy: [facility]/pharmacy #[id number] - [place], [address] #: [phone] Route: Oral  
  
We Performed the Following REFERRAL TO DERMATOLOGY [REF19 Custom] Follow-up Instructions Return in about 3 months (around 3/26/2018) for ADD, insomnia: with Cheri. Referral Information Referral ID Referred By Referred To  
  
 [id number] Cayla Correa Not Available Visits Status Start Date End Date 1 New Request 12/26/17 12/26/18 If your referral has a status of pending review or denied, additional information will be sent to support the outcome of this decision. Patient Instructions Folliculitis: Care Instructions Your Care Instructions Folliculitis (say \"twq-ZKT-hdv-LY-tus\") is an infection of the pouches (follicles) in the skin where hair grows. It can occur on any part of the body, but it is most common on the scalp, face, armpits, and groin. Bacteria, such as those found in a hot tub, can cause folliculitis. Folliculitis begins as a red, tender area near a strand of hair. The skin can itch or burn and may drain pus or blood. Sometimes folliculitis can lead to more serious skin infections. Your doctor usually can treat mild folliculitis with an antibiotic cream or ointment. If you have folliculitis on your scalp, you may use a shampoo that kills bacteria. Antibiotics you take as pills can treat infections deeper in the skin. For stubborn cases of folliculitis, laser treatment may be an option. Laser treatment uses strong beams of light to destroy the hair follicle. But hair will no longer grow in the treated area. Follow-up care is a key part of your treatment and safety. Be sure to make and go to all appointments, and call your doctor if you are having problems. It's also a good idea to know your test results and keep a list of the medicines you take. How can you care for yourself at home? · Take your medicine exactly as prescribed. If your doctor prescribed antibiotics, take them as directed. Do not stop taking them just because you feel better. You need to take the full course of antibiotics. · Use a soap that kills bacteria to wash the infected area. If your scalp or beard is infected, use a shampoo with selenium or propylene glycol. Be careful. Do not scrub too long or too hard. · Mix 1 1/3 cup warm water and 1 tablespoon vinegar. Soak a cloth in the mixture, and place it over the infected skin until it cools off (usually 5 to 10 minutes). You can do this 3 to 6 times a day. · Do not share your razor, towel, or washcloth. That can spread folliculitis. · Use a new blade in your razor each time you shave to keep from re-infecting your skin. · If you tend to get folliculitis, avoid using hot tubs. They can contain bacteria that cause folliculitis. When should you call for help? Call your doctor now or seek immediate medical care if: 
? · You have symptoms of infection, such as: 
¨ Increased pain, swelling, warmth, or redness. ¨ Red streaks leading from the area. ¨ Pus draining from the area. ¨ A fever. ? Watch closely for changes in your health, and be sure to contact your doctor if: ? · You do not get better as expected. Where can you learn more? Go to http://tay-efrain.info/. Enter M257 in the search box to learn more about \"Folliculitis: Care Instructions. \" Current as of: October 13, 2016 Content Version: 11.4 © 3119-0764 [employer]. Care instructions adapted under license by [employer] (which disclaims liability or warranty for this information). If you have questions about a medical condition or this instruction, always ask your healthcare professional. [address] any warranty or liability for your use of this information. Introducing [facility]! Josie Arora introduces [employer] patient portal. Now you can access parts of your medical record, email your doctor's office, and request medication refills online. 1. In your internet browser, go to [email or web address] 2. Click on the First Time User? Click Here link in the Sign In box. You will see the New Member Sign Up page. 3. Enter your [employer] Access Code exactly as it appears below. You will not need to use this code after youve completed the sign-up process. If you do not sign up before the expiration date, you must request a new code. · [employer] Access Code: [email or web address] Expires: 2/13/2018  8:53 AM 
 
4. Enter the last four digits of your Social Security Number (xxxx) and Date of Birth (mm/dd/yyyy) as indicated and click Submit. You will be taken to the next sign-up page. 5. Create a Medic[employer]ot ID. This will be your [employer] login ID and cannot be changed, so think of one that is secure and easy to remember. 6. Create a [employer] password. You can change your password at any time. 7. Enter your Password Reset Question and Answer. This can be used at a later time if you forget your password. 8. Enter your e-mail address. You will receive e-mail notification when new information is available in [address] 9. Click Sign Up. You can now view and download portions of your medical record. 10. Click the Download Summary menu link to download a portable copy of your medical information. If you have questions, please visit the Frequently Asked Questions section of the [employer] website. Remember, [employer] is NOT to be used for urgent needs. For medical emergencies, dial 911. Now available from your iPhone and Android! Please provide this summary of care documentation to your next provider. Your primary care clinician is listed as Lakeisha Nguyễn. If you have any questions after today's visit, please call [phone].

## 2017-12-26 NOTE — PATIENT INSTRUCTIONS
Folliculitis: Care Instructions  Your Care Instructions    Folliculitis (say \"usc-GRD-xvc-LY-tus\") is an infection of the pouches (follicles) in the skin where hair grows. It can occur on any part of the body, but it is most common on the scalp, face, armpits, and groin. Bacteria, such as those found in a hot tub, can cause folliculitis. Folliculitis begins as a red, tender area near a strand of hair. The skin can itch or burn and may drain pus or blood. Sometimes folliculitis can lead to more serious skin infections. Your doctor usually can treat mild folliculitis with an antibiotic cream or ointment. If you have folliculitis on your scalp, you may use a shampoo that kills bacteria. Antibiotics you take as pills can treat infections deeper in the skin. For stubborn cases of folliculitis, laser treatment may be an option. Laser treatment uses strong beams of light to destroy the hair follicle. But hair will no longer grow in the treated area. Follow-up care is a key part of your treatment and safety. Be sure to make and go to all appointments, and call your doctor if you are having problems. It's also a good idea to know your test results and keep a list of the medicines you take. How can you care for yourself at home? · Take your medicine exactly as prescribed. If your doctor prescribed antibiotics, take them as directed. Do not stop taking them just because you feel better. You need to take the full course of antibiotics. · Use a soap that kills bacteria to wash the infected area. If your scalp or beard is infected, use a shampoo with selenium or propylene glycol. Be careful. Do not scrub too long or too hard. · Mix 1 1/3 cup warm water and 1 tablespoon vinegar. Soak a cloth in the mixture, and place it over the infected skin until it cools off (usually 5 to 10 minutes). You can do this 3 to 6 times a day. · Do not share your razor, towel, or washcloth. That can spread folliculitis.   · Use a new blade in your razor each time you shave to keep from re-infecting your skin. · If you tend to get folliculitis, avoid using hot tubs. They can contain bacteria that cause folliculitis. When should you call for help? Call your doctor now or seek immediate medical care if:  ? · You have symptoms of infection, such as:  ¨ Increased pain, swelling, warmth, or redness. ¨ Red streaks leading from the area. ¨ Pus draining from the area. ¨ A fever. ? Watch closely for changes in your health, and be sure to contact your doctor if:  ? · You do not get better as expected. Where can you learn more? Go to http://tay-efrain.info/. Enter M257 in the search box to learn more about \"Folliculitis: Care Instructions. \"  Current as of: October 13, 2016  Content Version: 11.4  © 7105-0476 [employer]. Care instructions adapted under license by [employer] (which disclaims liability or warranty for this information). If you have questions about a medical condition or this instruction, always ask your healthcare professional. [address] any warranty or liability for your use of this information.

## 2017-12-26 NOTE — PROGRESS NOTES
Subjective:   Patient is a 30y.o. year old male who presents for Skin Problem (reoccuring)  1. Bumps on skin:  He had these before, one got infected and he saw Dr. Shanta Carey who treated with Keflex. They all resolved. Then in the last few weeks they've slowly returned. He has them on upper and lower extremities and torso. Never had these before. He has h/o eczema but no other skin problems. 2.  Discoloration armpits: This has started recently too. He's wondering if it's weight-related    3. Potential exposure to STD:  His girlfriend cheated on him. But no symptoms. He can't urinate today so doesn't want testing but said he could come back for this. 4.  Weight gain:  He's been going through a lot of stress and also was living out of hotel because of work, and so has gained a lot of weight. 5.  ADD:  Doing well on current dose of Ritalin. Asks for refill. 6.  Insomnia:  Asks for refill    Review of Systems   Constitutional: Negative. Cardiovascular: Negative. Current Outpatient Prescriptions on File Prior to Visit   Medication Sig Dispense Refill    losartan (COZAAR) 100 mg tablet TAKE 1 TAB BY MOUTH DAILY. 30 Tab 0    omeprazole (PRILOSEC) 10 mg capsule 10 mg. No current facility-administered medications on file prior to visit. Reviewed PmHx, RxHx, FmHx, SocHx, AllgHx and updated and dated in the chart. Nurse notes were reviewed and are correct    Objective:     Vitals:    12/26/17 1016 12/26/17 1017   BP: (!) 147/92 141/89   Pulse: 88    Resp: 18    Temp: 98 °F (36.7 °C)    TempSrc: Oral    SpO2: 96%    Weight: 254 lb (115.2 kg)    Height: 5' 10\" (1.778 m)      Physical Exam   Constitutional: He is oriented to person, place, and time. He appears well-developed and well-nourished. No distress. HENT:   Head: Normocephalic and atraumatic. Cardiovascular: Normal rate, regular rhythm, normal heart sounds and intact distal pulses.   Exam reveals no gallop and no friction rub. No murmur heard. Pulmonary/Chest: Effort normal and breath sounds normal. No respiratory distress. Abdominal: Soft. Bowel sounds are normal. He exhibits no distension. There is no tenderness. Musculoskeletal: He exhibits no edema. Lymphadenopathy:     He has no cervical adenopathy. Neurological: He is alert and oriented to person, place, and time. Skin: Skin is warm and dry. Erythematous pustules scattered on arms, legs, torso. Psychiatric: He has a normal mood and affect. His behavior is normal.   Nursing note and vitals reviewed. Assessment/ Plan:     Diagnoses and all orders for this visit:    1. Folliculitis:  Unusual why this would recur, but seems bacterial since responded to Keflex. Will repeat the Keflex and refer to derm for further w/u.  -     cephALEXin (KEFLEX) 500 mg capsule; Take 1 Cap by mouth four (4) times daily for 10 days.  -     REFERRAL TO DERMATOLOGY    2. Psychophysiological insomnia  -     temazepam (RESTORIL) 7.5 mg capsule; Take 1 Cap by mouth nightly as needed for Sleep. Max Daily Amount: 7.5 mg.    3. Attention deficit disorder (ADD) without hyperactivity  -     methylphenidate HCl (RITALIN) 10 mg tablet; Take 1 Tab by mouth three (3) times daily. Max Daily Amount: 30 mg.     I have discussed the diagnosis with the patient and the intended plan as seen in the above orders. The patient verbalized understanding and agrees with the plan. Follow-up Disposition:  Return in about 3 months (around 3/26/2018) for ADD, insomnia: with Maki Leal MD

## 2017-12-26 NOTE — PROGRESS NOTES
Chief Complaint   Patient presents with    Skin Problem     reoccuring     1. Have you been to the ER, urgent care clinic since your last visit? Hospitalized since your last visit? No    2. Have you seen or consulted any other health care providers outside of the [address] [place] [address] since your last visit? Include any pap smears or colon screening.  No

## 2018-01-07 RX ORDER — LOSARTAN POTASSIUM 100 MG/1
TABLET ORAL
Qty: 30 TAB | Refills: 0 | Status: SHIPPED | COMMUNITY
Start: 2018-01-07 | End: 2018-02-02 | Stop reason: SDUPTHER

## 2018-02-02 RX ORDER — OMEPRAZOLE 10 MG/1
10 CAPSULE, DELAYED RELEASE ORAL DAILY
Qty: 30 CAP | Refills: 0 | Status: SHIPPED | OUTPATIENT
Start: 2018-02-02 | End: 2018-03-19 | Stop reason: SDUPTHER

## 2018-02-02 RX ORDER — LOSARTAN POTASSIUM 100 MG/1
TABLET ORAL
Qty: 30 TAB | Refills: 0 | Status: SHIPPED | OUTPATIENT
Start: 2018-02-02 | End: 2018-03-06 | Stop reason: SDUPTHER

## 2018-02-20 DIAGNOSIS — F98.8 ATTENTION DEFICIT DISORDER (ADD) WITHOUT HYPERACTIVITY: ICD-10-CM

## 2018-02-22 RX ORDER — METHYLPHENIDATE HYDROCHLORIDE 10 MG/1
10 TABLET ORAL 3 TIMES DAILY
Qty: 90 TAB | Refills: 0 | Status: SHIPPED | OUTPATIENT
Start: 2018-02-22 | End: 2018-03-19 | Stop reason: SDUPTHER

## 2018-03-09 RX ORDER — LOSARTAN POTASSIUM 100 MG/1
TABLET ORAL
Qty: 90 TAB | Refills: 0 | Status: SHIPPED | OUTPATIENT
Start: 2018-03-09 | End: 2018-03-19 | Stop reason: SDUPTHER

## 2018-03-09 NOTE — TELEPHONE ENCOUNTER
Mr Worthy Samples called to get this refilled. Last OV 12/26/17. PT does have appt scheduled w/Cheri for 3/19.

## 2018-03-19 ENCOUNTER — OFFICE VISIT (OUTPATIENT)
Dept: INTERNAL MEDICINE CLINIC | Age: 38
End: 2018-03-19

## 2018-03-19 VITALS
DIASTOLIC BLOOD PRESSURE: 88 MMHG | OXYGEN SATURATION: 97 % | HEIGHT: 70 IN | HEART RATE: 70 BPM | TEMPERATURE: 98.4 F | RESPIRATION RATE: 16 BRPM | SYSTOLIC BLOOD PRESSURE: 133 MMHG | BODY MASS INDEX: 39.22 KG/M2 | WEIGHT: 274 LBS

## 2018-03-19 DIAGNOSIS — F98.8 ATTENTION DEFICIT DISORDER (ADD) WITHOUT HYPERACTIVITY: Primary | ICD-10-CM

## 2018-03-19 DIAGNOSIS — K21.9 GASTROESOPHAGEAL REFLUX DISEASE WITHOUT ESOPHAGITIS: ICD-10-CM

## 2018-03-19 DIAGNOSIS — I10 ESSENTIAL HYPERTENSION: ICD-10-CM

## 2018-03-19 RX ORDER — METHYLPHENIDATE HYDROCHLORIDE 10 MG/1
10 TABLET ORAL 3 TIMES DAILY
Qty: 90 TAB | Refills: 0 | Status: SHIPPED | OUTPATIENT
Start: 2018-05-14 | End: 2018-08-06 | Stop reason: SDUPTHER

## 2018-03-19 RX ORDER — METHYLPHENIDATE HYDROCHLORIDE 10 MG/1
10 TABLET ORAL 3 TIMES DAILY
Qty: 90 TAB | Refills: 0 | Status: SHIPPED | OUTPATIENT
Start: 2018-03-19 | End: 2018-08-06 | Stop reason: SDUPTHER

## 2018-03-19 RX ORDER — METHYLPHENIDATE HYDROCHLORIDE 10 MG/1
10 TABLET ORAL 3 TIMES DAILY
Qty: 90 TAB | Refills: 0 | Status: SHIPPED | OUTPATIENT
Start: 2018-04-16 | End: 2018-08-06 | Stop reason: SDUPTHER

## 2018-03-19 RX ORDER — LOSARTAN POTASSIUM 100 MG/1
TABLET ORAL
Qty: 90 TAB | Refills: 0 | Status: SHIPPED | OUTPATIENT
Start: 2018-03-19 | End: 2018-04-06 | Stop reason: SDUPTHER

## 2018-03-19 RX ORDER — OMEPRAZOLE 10 MG/1
10 CAPSULE, DELAYED RELEASE ORAL DAILY
Qty: 90 CAP | Refills: 1 | Status: SHIPPED | OUTPATIENT
Start: 2018-03-19 | End: 2018-04-09 | Stop reason: DRUGHIGH

## 2018-03-19 NOTE — MR AVS SNAPSHOT
Renee Pulse 
 
 
 [facility] [address] [facility] [address] 
[phone] Patient: Ranjit Venegas MRN: [id number] COLBERT:[birth year] Visit Information Date & Time Provider Department Dept. Phone Encounter #  
 3/19/2018 11:15 AM Terral Barthel, PA-C [facility] Austin [id number] Follow-up Instructions Return in about 3 months (around 6/19/2018) for ADD. Upcoming Health Maintenance Date Due DTaP/Tdap/Td series (1 - Tdap) 6/6/2003 Influenza Age 5 to Adult 8/1/2017 Allergies as of 3/19/2018  Review Complete On: 3/19/2018 By: Love Flores LPN No Known Allergies Current Immunizations  Never Reviewed No immunizations on file. Not reviewed this visit You Were Diagnosed With   
  
 Codes Comments Attention deficit disorder (ADD) without hyperactivity    -  Primary ICD-10-CM: F98.8 ICD-9-CM: 314.00 Gastroesophageal reflux disease without esophagitis     ICD-10-CM: K21.9 ICD-9-CM: 530.81 Essential hypertension     ICD-10-CM: I10 
ICD-9-CM: 401.9 Vitals BP Pulse Temp Resp Height(growth percentile) Weight(growth percentile) 133/88 (BP 1 Location: Left arm, BP Patient Position: Sitting) 70 98.4 °F (36.9 °C) (Oral) 16 5' 10\" (1.778 m) 274 lb (124.3 kg) SpO2 BMI Smoking Status 97% 39.31 kg/m2 Never Smoker Vitals History BMI and BSA Data Body Mass Index Body Surface Area  
 39.31 kg/m 2 2.48 m 2 Preferred Pharmacy Pharmacy Name Phone [facility] [address] [place] [id number] [place] [address] [phone] Your Updated Medication List  
  
   
This list is accurate as of 3/19/18 11:23 AM.  Always use your most recent med list.  
  
  
  
  
 losartan 100 mg tablet Commonly known as:  COZAAR  
TAKE 1 TABLET BY MOUTH EVERY DAY  
  
 * methylphenidate HCl 10 mg tablet Commonly known as:  RITALIN  
 Take 1 Tab by mouth three (3) times daily. Max Daily Amount: 30 mg.  
  
 * methylphenidate HCl 10 mg tablet Commonly known as:  RITALIN Take 1 Tab by mouth three (3) times daily. Max Daily Amount: 30 mg. Start taking on:  4/16/2018 * methylphenidate HCl 10 mg tablet Commonly known as:  RITALIN Take 1 Tab by mouth three (3) times daily. Max Daily Amount: 30 mg. Start taking on:  5/14/2018  
  
 omeprazole 10 mg capsule Commonly known as:  PRILOSEC Take 1 Cap by mouth daily. temazepam 7.5 mg capsule Commonly known as:  RESTORIL Take 1 Cap by mouth nightly as needed for Sleep. Max Daily Amount: 7.5 mg.  
  
 * Notice: This list has 3 medication(s) that are the same as other medications prescribed for you. Read the directions carefully, and ask your doctor or other care provider to review them with you. Prescriptions Printed Refills  
 methylphenidate HCl (RITALIN) 10 mg tablet 0 Sig: Take 1 Tab by mouth three (3) times daily. Max Daily Amount: 30 mg.  
 Class: Print Route: Oral  
 methylphenidate HCl (RITALIN) 10 mg tablet 0 Starting on: 4/16/2018 Sig: Take 1 Tab by mouth three (3) times daily. Max Daily Amount: 30 mg.  
 Class: Print Route: Oral  
 methylphenidate HCl (RITALIN) 10 mg tablet 0 Starting on: 5/14/2018 Sig: Take 1 Tab by mouth three (3) times daily. Max Daily Amount: 30 mg.  
 Class: Print Route: Oral  
  
Prescriptions Sent to Pharmacy Refills  
 losartan (COZAAR) 100 mg tablet 0 Sig: TAKE 1 TABLET BY MOUTH EVERY DAY Class: Normal  
 Pharmacy: [facility]/pharmacy #[id number] [address] [place] [address] Ph #: [phone]  
 omeprazole (PRILOSEC) 10 mg capsule 1 Sig: Take 1 Cap by mouth daily. Class: Normal  
 Pharmacy: [facility]/pharmacy #[id number] [address] Ph #: [phone] Route: Oral  
  
Follow-up Instructions Return in about 3 months (around 6/19/2018) for ADD. To-Do List   
 03/19/2018 Lab:  CBC WITH AUTOMATED DIFF   
  
 03/19/2018 Lab:  LIPID PANEL   
  
 03/19/2018 Lab:  METABOLIC PANEL, COMPREHENSIVE Patient Instructions Learning About Attention Deficit Hyperactivity Disorder (ADHD) in Adults What is ADHD? Attention deficit hyperactivity disorder (ADHD) is a condition in which people have a hard time paying attention. Adults with ADHD also may be more active than normal. They tend to act without thinking. ADHD may make it harder for them to focus, get organized, and finish tasks. ADHD most often starts in childhood and lasts into adulthood. Many adults don't know that they have ADHD until their children are diagnosed. Then they begin to see their own symptoms. Doctors don't know what causes ADHD. But it tends to run in families. What are the symptoms? The most common types of ADHD symptoms in adults are attention problems and hyperactivity. Attention problems Adults with ADHD often find it hard to: · Finish tasks that don't interest them or aren't easy. But they may become obsessed with activities that they find interesting and enjoy. · Keep relationships. · Focus their attention on conversations, reading materials, or jobs. They may change jobs a lot. · Remember things. They may misplace or lose things. · Pay attention. They are easily distracted. They find it hard to focus on one task. · Think before they act. They may make quick decisions. They may act before they think about the effect of their actions. Hyperactivity Adults with ADHD may: · Fidget. They may swing their legs, shift in their seats, or tap their fingers. · Move around a lot. They may feel \"revved up\" or on the go. They may not be able to slow down until they are very tired. · Find it hard to relax. They may feel restless and find it hard to do quiet things like read or watch TV. How does ADHD affect daily life? ADHD in adults may affect: · Job performance. They may find it hard to organize their work, manage their time, and focus on one task at a time. They may forget, misplace, or lose things. They may quit their jobs out of boredom. · Relationships. Adults with ADHD may find it hard to focus their attention on conversations. It is hard for them to \"read\" the behavior and moods of others and express their own feelings. · Temper. They may get easily frustrated. This often can make it harder for them to deal with stress. These adults may overreact and have a short, quick temper. · The ability to solve problems. Adults who have a hard time waiting for things they want may act before they think about the effect of their actions. They may take part in risky behaviors. These include unprotected sex, unsafe driving, alcohol and drug use, or unwise business ventures. How is ADHD treated? ?ADHD can be treated with medicines, behavior training, or counseling. Or it may be a combination of these treatments. Medicines ? Stimulant medicines are most often used to treat ADHD. These may include: 
? · Amphetamines (such as Adderall and Dexedrine). ? · Methylphenidate (such as Concerta, Daytrana, Focalin, Metadate, and Ritalin). ? Other medicines that may be used are: 
? · Atomoxetine, such as Strattera, a nonstimulant medicine for ADHD. ? · Antihypertensives. These include clonidine (such as Catapres) and guanfacine (such as Tenex). ? · Antidepressants, which include bupropion (Wellbutrin). ?Behavior training ? Behavior training can help adults with ADHD learn how to: 
? · Get organized. A daily organizer or planner can help these adults organize their daily tasks. They can write down appointments and other things they need to remember. ? · Decrease distractions. They can set up their work or home environment so that there are fewer things that will distract them.  They may find using headphones or a \"white noise\" machine helpful. College students can arrange a quiet living situation. They may need a single dorm room. ? · Work on relationships. Social skills training can help adults with ADHD relate to family, friends, and coworkers. Couples counseling or family therapy can also help improve relationships. ? Counseling ? Counseling is not meant to treat inattention, hyperactivity, or impulsiveness. But it can help with some of the problems that go along with ADHD. These include not getting along well with others and having problems following rules. Where can you learn more? Go to http://tay-efrain.info/. Enter A710 in the search box to learn more about \"Learning About Attention Deficit Hyperactivity Disorder (ADHD) in Adults. \" Current as of: May 12, 2017 Content Version: 11.4 © 5664-5189 Healthwise, [employer]. Care instructions adapted under license by [employer] (which disclaims liability or warranty for this information). If you have questions about a medical condition or this instruction, always ask your healthcare professional. [place] [address] any warranty or liability for your use of this information. Introducing \[facility] [place] [facility]\"" & HEALTH SERVICES! [employer] introduces [employer] patient portal. Now you can access parts of your medical record, email your doctor's office, and request medication refills online. 1. In your internet browser, go to [email or web address] 2. Click on the First Time User? Click Here link in the Sign In box. You will see the New Member Sign Up page. 3. Enter your [employer] Access Code exactly as it appears below. You will not need to use this code after youve completed the sign-up process. If you do not sign up before the expiration date, you must request a new code. · [employer] Access Code: [email or web address] Expires: 6/17/2018 11:23 AM 
 
 4. Enter the last four digits of your Social Security Number (xxxx) and Date of Birth (mm/dd/yyyy) as indicated and click Submit. You will be taken to the next sign-up page. 5. Create a [employer] ID. This will be your [employer] login ID and cannot be changed, so think of one that is secure and easy to remember. 6. Create a [employer] password. You can change your password at any time. 7. Enter your Password Reset Question and Answer. This can be used at a later time if you forget your password. 8. Enter your e-mail address. You will receive e-mail notification when new information is available in [address] 9. Click Sign Up. You can now view and download portions of your medical record. 10. Click the Download Summary menu link to download a portable copy of your medical information. If you have questions, please visit the Frequently Asked Questions section of the [employer] website. Remember, [employer] is NOT to be used for urgent needs. For medical emergencies, dial 911. Now available from your iPhone and Android! Please provide this summary of care documentation to your next provider. Your primary care clinician is listed as Ashanti Nguyễn. If you have any questions after today's visit, please call [phone].

## 2018-03-19 NOTE — PROGRESS NOTES
Chief Complaint   Patient presents with    Attention Deficit Disorder     Need refill Ritalin, requesting 3 months.  Hypertension     Need refill on Losartan. 1. Have you been to the ER, urgent care clinic since your last visit? Hospitalized since your last visit? No    2. Have you seen or consulted any other health care providers outside of the [address] [place] [address] since your last visit? Include any pap smears or colon screening.  No    .  PHQ over the last two weeks 3/19/2018   Little interest or pleasure in doing things Not at all   Feeling down, depressed or hopeless Not at all   Total Score PHQ 2 0

## 2018-03-19 NOTE — PROGRESS NOTES
HISTORY OF PRESENT ILLNESS  Stacie Coker is a 30 y.o. male. HPI   Pt is here for f/u on his htn, GERD, and ADD. Pt has not had labs and is not fasting but will get them anyway today. He has no complaints and is doing well on all meds. He denies insomnia, anxiety, CP, palpitations, edema, weight changes, HA, dizziness, and N/V/D. No Known Allergies    Current Outpatient Prescriptions   Medication Sig    losartan (COZAAR) 100 mg tablet TAKE 1 TABLET BY MOUTH EVERY DAY    methylphenidate HCl (RITALIN) 10 mg tablet Take 1 Tab by mouth three (3) times daily. Max Daily Amount: 30 mg.    omeprazole (PRILOSEC) 10 mg capsule Take 1 Cap by mouth daily.  [START ON 4/16/2018] methylphenidate HCl (RITALIN) 10 mg tablet Take 1 Tab by mouth three (3) times daily. Max Daily Amount: 30 mg.    [START ON 5/14/2018] methylphenidate HCl (RITALIN) 10 mg tablet Take 1 Tab by mouth three (3) times daily. Max Daily Amount: 30 mg.  temazepam (RESTORIL) 7.5 mg capsule Take 1 Cap by mouth nightly as needed for Sleep. Max Daily Amount: 7.5 mg. No current facility-administered medications for this visit. Review of Systems   Constitutional: Negative. Negative for chills, fever and malaise/fatigue. HENT: Negative. Negative for congestion, ear pain, sore throat and tinnitus. Eyes: Negative. Negative for blurred vision, double vision and photophobia. Respiratory: Negative. Negative for cough, shortness of breath and wheezing. Cardiovascular: Negative. Negative for chest pain, palpitations and leg swelling. Gastrointestinal: Positive for heartburn (controlled w meds). Negative for abdominal pain, nausea and vomiting. Genitourinary: Negative. Negative for dysuria, frequency, hematuria and urgency. Musculoskeletal: Negative. Negative for back pain, joint pain, myalgias and neck pain. Skin: Negative. Negative for itching and rash. Neurological: Negative.   Negative for dizziness, tingling, tremors and headaches. Psychiatric/Behavioral: Negative. Negative for depression and memory loss. The patient is not nervous/anxious and does not have insomnia. Visit Vitals    /88 (BP 1 Location: Left arm, BP Patient Position: Sitting)    Pulse 70    Temp 98.4 °F (36.9 °C) (Oral)    Resp 16    Ht 5' 10\" (1.778 m)    Wt 274 lb (124.3 kg)    SpO2 97%    BMI 39.31 kg/m2       Physical Exam   Constitutional: He is oriented to person, place, and time. He appears well-developed and well-nourished. No distress. HENT:   Head: Normocephalic and atraumatic. Eyes: Pupils are equal, round, and reactive to light. Cardiovascular: Normal rate, regular rhythm and normal heart sounds. Exam reveals no gallop and no friction rub. No murmur heard. Pulmonary/Chest: Effort normal and breath sounds normal. No respiratory distress. He has no wheezes. He has no rales. Neurological: He is alert and oriented to person, place, and time. Skin: Skin is warm and dry. He is not diaphoretic. Psychiatric: He has a normal mood and affect. His behavior is normal.       ASSESSMENT and PLAN    ICD-10-CM ICD-9-CM    1. Attention deficit disorder (ADD) without hyperactivity F98.8 314.00 methylphenidate HCl (RITALIN) 10 mg tablet      methylphenidate HCl (RITALIN) 10 mg tablet      methylphenidate HCl (RITALIN) 10 mg tablet   2. Gastroesophageal reflux disease without esophagitis K21.9 530.81 omeprazole (PRILOSEC) 10 mg capsule   3. Essential hypertension I10 401.9 losartan (COZAAR) 100 mg tablet      CBC WITH AUTOMATED DIFF      METABOLIC PANEL, COMPREHENSIVE      LIPID PANEL     Follow-up Disposition:  Return in about 3 months (around 6/19/2018) for ADD. Pt expressed understanding of visit summary and plans for any follow ups or referrals as well as any medications prescribed.

## 2018-03-19 NOTE — PATIENT INSTRUCTIONS
Learning About Attention Deficit Hyperactivity Disorder (ADHD) in Adults  What is ADHD? Attention deficit hyperactivity disorder (ADHD) is a condition in which people have a hard time paying attention. Adults with ADHD also may be more active than normal. They tend to act without thinking. ADHD may make it harder for them to focus, get organized, and finish tasks. ADHD most often starts in childhood and lasts into adulthood. Many adults don't know that they have ADHD until their children are diagnosed. Then they begin to see their own symptoms. Doctors don't know what causes ADHD. But it tends to run in families. What are the symptoms? The most common types of ADHD symptoms in adults are attention problems and hyperactivity. Attention problems  Adults with ADHD often find it hard to:  · Finish tasks that don't interest them or aren't easy. But they may become obsessed with activities that they find interesting and enjoy. · Keep relationships. · Focus their attention on conversations, reading materials, or jobs. They may change jobs a lot. · Remember things. They may misplace or lose things. · Pay attention. They are easily distracted. They find it hard to focus on one task. · Think before they act. They may make quick decisions. They may act before they think about the effect of their actions. Hyperactivity  Adults with ADHD may:  · Fidget. They may swing their legs, shift in their seats, or tap their fingers. · Move around a lot. They may feel \"revved up\" or on the go. They may not be able to slow down until they are very tired. · Find it hard to relax. They may feel restless and find it hard to do quiet things like read or watch TV. How does ADHD affect daily life? ADHD in adults may affect:  · Job performance. They may find it hard to organize their work, manage their time, and focus on one task at a time. They may forget, misplace, or lose things.  They may quit their jobs out of boredom. · Relationships. Adults with ADHD may find it hard to focus their attention on conversations. It is hard for them to \"read\" the behavior and moods of others and express their own feelings. · Temper. They may get easily frustrated. This often can make it harder for them to deal with stress. These adults may overreact and have a short, quick temper. · The ability to solve problems. Adults who have a hard time waiting for things they want may act before they think about the effect of their actions. They may take part in risky behaviors. These include unprotected sex, unsafe driving, alcohol and drug use, or unwise business ventures. How is ADHD treated? ?ADHD can be treated with medicines, behavior training, or counseling. Or it may be a combination of these treatments. Medicines  ? Stimulant medicines are most often used to treat ADHD. These may include:  ? · Amphetamines (such as Adderall and Dexedrine). ? · Methylphenidate (such as Concerta, Daytrana, Focalin, Metadate, and Ritalin). ? Other medicines that may be used are:  ? · Atomoxetine, such as Strattera, a nonstimulant medicine for ADHD. ? · Antihypertensives. These include clonidine (such as Catapres) and guanfacine (such as Tenex). ? · Antidepressants, which include bupropion (Wellbutrin). ?Behavior training  ? Behavior training can help adults with ADHD learn how to:  ? · Get organized. A daily organizer or planner can help these adults organize their daily tasks. They can write down appointments and other things they need to remember. ? · Decrease distractions. They can set up their work or home environment so that there are fewer things that will distract them. They may find using headphones or a \"white noise\" machine helpful. College students can arrange a quiet living situation. They may need a single dorm room. ? · Work on relationships. Social skills training can help adults with ADHD relate to family, friends, and coworkers. Couples counseling or family therapy can also help improve relationships. ? Counseling  ? Counseling is not meant to treat inattention, hyperactivity, or impulsiveness. But it can help with some of the problems that go along with ADHD. These include not getting along well with others and having problems following rules. Where can you learn more? Go to http://tay-efrain.info/. Enter O054 in the search box to learn more about \"Learning About Attention Deficit Hyperactivity Disorder (ADHD) in Adults. \"  Current as of: May 12, 2017  Content Version: 11.4  © 9982-1042 Healthwise, [employer]. Care instructions adapted under license by [employer] (which disclaims liability or warranty for this information). If you have questions about a medical condition or this instruction, always ask your healthcare professional. [address] any warranty or liability for your use of this information.

## 2018-03-20 LAB
A-G RATIO,AGRAT: 2 RATIO (ref 1.1–2.6)
ABSOLUTE LYMPHOCYTE COUNT, 10803: 2.3 K/UL (ref 1–4.8)
ALBUMIN SERPL-MCNC: 5 G/DL (ref 3.5–5)
ALP SERPL-CCNC: 74 U/L (ref 25–115)
ALT SERPL-CCNC: 55 U/L (ref 5–40)
ANION GAP SERPL CALC-SCNC: 21 MMOL/L
AST SERPL W P-5'-P-CCNC: 32 U/L (ref 10–37)
BASOPHILS # BLD: 0 K/UL (ref 0–0.2)
BASOPHILS NFR BLD: 1 % (ref 0–2)
BILIRUB SERPL-MCNC: 0.4 MG/DL (ref 0.2–1.2)
BUN SERPL-MCNC: 11 MG/DL (ref 6–22)
CALCIUM SERPL-MCNC: 10.1 MG/DL (ref 8.4–10.4)
CHLORIDE SERPL-SCNC: 99 MMOL/L (ref 98–110)
CHOLEST SERPL-MCNC: 221 MG/DL (ref 110–200)
CO2 SERPL-SCNC: 24 MMOL/L (ref 20–32)
CREAT SERPL-MCNC: 0.9 MG/DL (ref 0.5–1.2)
EOSINOPHIL # BLD: 0.3 K/UL (ref 0–0.5)
EOSINOPHIL NFR BLD: 3 % (ref 0–6)
ERYTHROCYTE [DISTWIDTH] IN BLOOD BY AUTOMATED COUNT: 13.8 % (ref 10–15.5)
GFRAA, 66117: >60
GFRNA, 66118: >60
GLOBULIN,GLOB: 2.5 G/DL (ref 2–4)
GLUCOSE SERPL-MCNC: 116 MG/DL (ref 70–99)
GRANULOCYTES,GRANS: 62 % (ref 40–75)
HCT VFR BLD AUTO: 48.5 % (ref 36.6–51.9)
HDLC SERPL-MCNC: 34 MG/DL (ref 40–59)
HDLC SERPL-MCNC: 6.5 MG/DL (ref 0–5)
HGB BLD-MCNC: 15.6 G/DL (ref 13.2–17.3)
LDLC SERPL CALC-MCNC: 121 MG/DL (ref 50–99)
LYMPHOCYTES, LYMLT: 29 % (ref 20–45)
MCH RBC QN AUTO: 29 PG (ref 26–34)
MCHC RBC AUTO-ENTMCNC: 32 G/DL (ref 31–36)
MCV RBC AUTO: 90 FL (ref 80–95)
MONOCYTES # BLD: 0.4 K/UL (ref 0.1–1)
MONOCYTES NFR BLD: 5 % (ref 3–12)
NEUTROPHILS # BLD AUTO: 4.8 K/UL (ref 1.8–7.7)
PLATELET # BLD AUTO: 306 K/UL (ref 140–440)
PMV BLD AUTO: 10.8 FL (ref 9–13)
POTASSIUM SERPL-SCNC: 4.8 MMOL/L (ref 3.5–5.5)
PROT SERPL-MCNC: 7.5 G/DL (ref 6.4–8.3)
RBC # BLD AUTO: 5.4 M/UL (ref 3.8–5.8)
SODIUM SERPL-SCNC: 144 MMOL/L (ref 133–145)
TRIGL SERPL-MCNC: 331 MG/DL (ref 40–149)
VLDLC SERPL CALC-MCNC: 66 MG/DL (ref 8–30)
WBC # BLD AUTO: 7.8 K/UL (ref 4–11)

## 2018-04-06 DIAGNOSIS — K21.9 GASTROESOPHAGEAL REFLUX DISEASE WITHOUT ESOPHAGITIS: ICD-10-CM

## 2018-04-06 DIAGNOSIS — I10 ESSENTIAL HYPERTENSION: ICD-10-CM

## 2018-04-06 RX ORDER — OMEPRAZOLE 10 MG/1
10 CAPSULE, DELAYED RELEASE ORAL DAILY
Qty: 90 CAP | Refills: 1 | Status: CANCELLED | OUTPATIENT
Start: 2018-04-06

## 2018-04-06 NOTE — TELEPHONE ENCOUNTER
Pt calling in for medication refills; also pt is requesting 40mg prilosec which I do not see in his chart

## 2018-04-09 RX ORDER — LOSARTAN POTASSIUM 100 MG/1
TABLET ORAL
Qty: 90 TAB | Refills: 0 | Status: SHIPPED | OUTPATIENT
Start: 2018-04-09 | End: 2018-09-01 | Stop reason: SDUPTHER

## 2018-04-09 RX ORDER — OMEPRAZOLE 40 MG/1
40 CAPSULE, DELAYED RELEASE ORAL DAILY
Qty: 30 CAP | Refills: 5 | Status: SHIPPED | OUTPATIENT
Start: 2018-04-09 | End: 2018-07-16 | Stop reason: SDUPTHER

## 2018-07-13 DIAGNOSIS — K21.9 GASTROESOPHAGEAL REFLUX DISEASE WITHOUT ESOPHAGITIS: ICD-10-CM

## 2018-07-16 RX ORDER — OMEPRAZOLE 40 MG/1
40 CAPSULE, DELAYED RELEASE ORAL DAILY
Qty: 30 CAP | Refills: 5 | Status: SHIPPED | COMMUNITY
Start: 2018-07-16 | End: 2019-05-16 | Stop reason: SDUPTHER

## 2018-08-06 ENCOUNTER — OFFICE VISIT (OUTPATIENT)
Dept: INTERNAL MEDICINE CLINIC | Age: 38
End: 2018-08-06

## 2018-08-06 VITALS
OXYGEN SATURATION: 96 % | HEART RATE: 84 BPM | SYSTOLIC BLOOD PRESSURE: 146 MMHG | RESPIRATION RATE: 18 BRPM | TEMPERATURE: 98.5 F | BODY MASS INDEX: 40.94 KG/M2 | DIASTOLIC BLOOD PRESSURE: 84 MMHG | WEIGHT: 286 LBS | HEIGHT: 70 IN

## 2018-08-06 DIAGNOSIS — F98.8 ATTENTION DEFICIT DISORDER (ADD) WITHOUT HYPERACTIVITY: ICD-10-CM

## 2018-08-06 DIAGNOSIS — J30.9 ALLERGIC RHINITIS, UNSPECIFIED SEASONALITY, UNSPECIFIED TRIGGER: ICD-10-CM

## 2018-08-06 DIAGNOSIS — E66.01 OBESITY, MORBID (HCC): ICD-10-CM

## 2018-08-06 DIAGNOSIS — G43.909 MIGRAINE WITHOUT STATUS MIGRAINOSUS, NOT INTRACTABLE, UNSPECIFIED MIGRAINE TYPE: Primary | ICD-10-CM

## 2018-08-06 RX ORDER — KETOROLAC TROMETHAMINE 10 MG/1
10 TABLET, FILM COATED ORAL
Qty: 20 TAB | Refills: 1 | Status: SHIPPED | OUTPATIENT
Start: 2018-08-06 | End: 2019-04-15

## 2018-08-06 RX ORDER — BUTALBITAL AND ACETAMINOPHEN 325; 50 MG/1; MG/1
1 TABLET ORAL
Qty: 30 TAB | Refills: 1 | Status: SHIPPED | OUTPATIENT
Start: 2018-08-06 | End: 2019-05-02 | Stop reason: SDUPTHER

## 2018-08-06 RX ORDER — METHYLPHENIDATE HYDROCHLORIDE 10 MG/1
10 TABLET ORAL 3 TIMES DAILY
Qty: 90 TAB | Refills: 0 | Status: SHIPPED | OUTPATIENT
Start: 2018-08-06 | End: 2018-12-13 | Stop reason: SDUPTHER

## 2018-08-06 RX ORDER — KETOROLAC TROMETHAMINE 10 MG/1
10 TABLET, FILM COATED ORAL
COMMUNITY
End: 2018-08-06 | Stop reason: SDUPTHER

## 2018-08-06 RX ORDER — MONTELUKAST SODIUM 10 MG/1
10 TABLET ORAL DAILY
Qty: 30 TAB | Refills: 11 | Status: SHIPPED | OUTPATIENT
Start: 2018-08-06 | End: 2019-04-29 | Stop reason: SDUPTHER

## 2018-08-06 RX ORDER — PROMETHAZINE HYDROCHLORIDE 25 MG/1
25 TABLET ORAL
Qty: 30 TAB | Refills: 1 | Status: SHIPPED | OUTPATIENT
Start: 2018-08-06 | End: 2018-11-05

## 2018-08-06 RX ORDER — BUTALBITAL AND ACETAMINOPHEN 325; 50 MG/1; MG/1
1 TABLET ORAL
COMMUNITY
End: 2018-08-06 | Stop reason: SDUPTHER

## 2018-08-06 RX ORDER — METHYLPHENIDATE HYDROCHLORIDE 10 MG/1
10 TABLET ORAL 3 TIMES DAILY
Qty: 90 TAB | Refills: 0 | Status: SHIPPED | OUTPATIENT
Start: 2018-09-03 | End: 2018-12-13 | Stop reason: SDUPTHER

## 2018-08-06 RX ORDER — METHYLPHENIDATE HYDROCHLORIDE 10 MG/1
10 TABLET ORAL 3 TIMES DAILY
Qty: 90 TAB | Refills: 0 | Status: SHIPPED | OUTPATIENT
Start: 2018-10-01 | End: 2018-12-13 | Stop reason: SDUPTHER

## 2018-08-13 NOTE — PATIENT INSTRUCTIONS

## 2018-08-13 NOTE — PROGRESS NOTES
HISTORY OF PRESENT ILLNESS  Yogesh Koo is a 41 y.o. male. HPI   Pt is here for f/u on his ADD and refills on his ritalin. Pt has been on med for many years and has been doing well on it. Denies CP, palpitations, dizziness, insomnia, anxiety, and dizziness. He has a hx of migraines and has had an increase in them since moving to [place] last year. He has also had an increas in his allergies but does not do well with antihistamines. Pt allergies likely the reason for his increase n HAs. Denies N/V, fever, chills, dizziness, rash, ST, and cough. No Known Allergies    Current Outpatient Prescriptions   Medication Sig    ketorolac (TORADOL) 10 mg tablet Take 1 Tab by mouth every six (6) hours as needed for Pain.  butalbital-acetaminophen (PHRENILIN)  mg tablet Take 1 Tab by mouth every six (6) hours as needed.  promethazine (PHENERGAN) 25 mg tablet Take 1 Tab by mouth every six (6) hours as needed for Nausea.  [START ON 10/1/2018] methylphenidate HCl (RITALIN) 10 mg tablet Take 1 Tab by mouth three (3) times daily. Max Daily Amount: 30 mg.    [START ON 9/3/2018] methylphenidate HCl (RITALIN) 10 mg tablet Take 1 Tab by mouth three (3) times daily. Max Daily Amount: 30 mg.    methylphenidate HCl (RITALIN) 10 mg tablet Take 1 Tab by mouth three (3) times daily. Max Daily Amount: 30 mg.    montelukast (SINGULAIR) 10 mg tablet Take 1 Tab by mouth daily.  omeprazole (PRILOSEC) 40 mg capsule Take 1 Cap by mouth daily.  losartan (COZAAR) 100 mg tablet TAKE 1 TABLET BY MOUTH EVERY DAY    temazepam (RESTORIL) 7.5 mg capsule Take 1 Cap by mouth nightly as needed for Sleep. Max Daily Amount: 7.5 mg. No current facility-administered medications for this visit. Review of Systems   Constitutional: Negative. Negative for chills, fever and malaise/fatigue. HENT: Negative. Negative for congestion, ear pain, sore throat and tinnitus. Eyes: Negative.   Negative for blurred vision, double vision and photophobia. Respiratory: Negative. Negative for cough, shortness of breath and wheezing. Cardiovascular: Negative. Negative for chest pain, palpitations and leg swelling. Gastrointestinal: Positive for heartburn (controlled w meds). Negative for abdominal pain, nausea and vomiting. Genitourinary: Negative. Negative for dysuria, frequency, hematuria and urgency. Musculoskeletal: Negative. Negative for back pain, joint pain, myalgias and neck pain. Skin: Negative. Negative for itching and rash. Neurological: Positive for headaches (not currently). Negative for dizziness, tingling and tremors. Endo/Heme/Allergies: Positive for environmental allergies. Psychiatric/Behavioral: Negative. Negative for depression and memory loss. The patient is not nervous/anxious and does not have insomnia. Visit Vitals    /84 (BP 1 Location: Left arm, BP Patient Position: Sitting)    Pulse 84    Temp 98.5 °F (36.9 °C) (Oral)    Resp 18    Ht 5' 10\" (1.778 m)    Wt 286 lb (129.7 kg)    SpO2 96%    BMI 41.04 kg/m2       Physical Exam   Constitutional: He is oriented to person, place, and time. He appears well-developed and well-nourished. No distress. Pt is obese   HENT:   Head: Normocephalic and atraumatic. Nose: Mucosal edema and rhinorrhea present. Right sinus exhibits no maxillary sinus tenderness and no frontal sinus tenderness. Left sinus exhibits no maxillary sinus tenderness and no frontal sinus tenderness. Eyes: Pupils are equal, round, and reactive to light. Cardiovascular: Normal rate, regular rhythm and normal heart sounds. Exam reveals no gallop and no friction rub. No murmur heard. Pulmonary/Chest: Effort normal and breath sounds normal. No respiratory distress. He has no wheezes. He has no rales. Neurological: He is alert and oriented to person, place, and time. Skin: Skin is warm and dry. He is not diaphoretic. Psychiatric: He has a normal mood and affect. His behavior is normal.       ASSESSMENT and PLAN    ICD-10-CM ICD-9-CM    1. Migraine without status migrainosus, not intractable, unspecified migraine type G43.909 346.90 ketorolac (TORADOL) 10 mg tablet      butalbital-acetaminophen (PHRENILIN)  mg tablet      promethazine (PHENERGAN) 25 mg tablet   2. Attention deficit disorder (ADD) without hyperactivity F98.8 314.00 methylphenidate HCl (RITALIN) 10 mg tablet      methylphenidate HCl (RITALIN) 10 mg tablet      methylphenidate HCl (RITALIN) 10 mg tablet   3. Allergic rhinitis, unspecified seasonality, unspecified trigger J30.9 477.9 montelukast (SINGULAIR) 10 mg tablet   4. Obesity, morbid ([facility] [address] E66.01 278.01      Follow-up Disposition:  Return in about 3 months (around 11/6/2018) for ADD. Pt expressed understanding of visit summary and plans for any follow ups or referrals as well as any medications prescribed.

## 2018-08-14 ENCOUNTER — OFFICE VISIT (OUTPATIENT)
Dept: INTERNAL MEDICINE CLINIC | Age: 38
End: 2018-08-14

## 2018-08-14 VITALS
WEIGHT: 293 LBS | OXYGEN SATURATION: 95 % | SYSTOLIC BLOOD PRESSURE: 132 MMHG | RESPIRATION RATE: 18 BRPM | DIASTOLIC BLOOD PRESSURE: 84 MMHG | HEART RATE: 86 BPM | BODY MASS INDEX: 41.95 KG/M2 | TEMPERATURE: 98.5 F | HEIGHT: 70 IN

## 2018-08-14 DIAGNOSIS — K21.9 GASTROESOPHAGEAL REFLUX DISEASE, ESOPHAGITIS PRESENCE NOT SPECIFIED: ICD-10-CM

## 2018-08-14 DIAGNOSIS — E66.01 SEVERE OBESITY (BMI >= 40) (HCC): Primary | ICD-10-CM

## 2018-08-14 DIAGNOSIS — R06.83 SNORES: ICD-10-CM

## 2018-08-14 DIAGNOSIS — R53.81 MALAISE: ICD-10-CM

## 2018-08-14 DIAGNOSIS — G47.19 EXCESSIVE DAYTIME SLEEPINESS: ICD-10-CM

## 2018-08-14 DIAGNOSIS — R09.2 HAS STOPPED BREATHING (HCC): ICD-10-CM

## 2018-08-14 NOTE — PROGRESS NOTES
Chief Complaint   Patient presents with    Sleep Problem     can't sleep at night, wakes up gasping for air. Throat sore and hoarse for about 1 year, wants thyroid checked. 1. Have you been to the ER, urgent care clinic since your last visit? Hospitalized since your last visit? No    2. Have you seen or consulted any other health care providers outside of the [address] [place] [address] since your last visit? Include any pap smears or colon screening.  No  PHQ over the last two weeks 8/14/2018   Little interest or pleasure in doing things Not at all   Feeling down, depressed, irritable, or hopeless Not at all   Total Score PHQ 2 0

## 2018-08-15 LAB
ABSOLUTE LYMPHOCYTE COUNT, 10803: 2.2 K/UL (ref 1–4.8)
BASOPHILS # BLD: 0 K/UL (ref 0–0.2)
BASOPHILS NFR BLD: 0 % (ref 0–2)
EOSINOPHIL # BLD: 0.3 K/UL (ref 0–0.5)
EOSINOPHIL NFR BLD: 3 % (ref 0–6)
ERYTHROCYTE [DISTWIDTH] IN BLOOD BY AUTOMATED COUNT: 12.9 % (ref 10–15.5)
GRANULOCYTES,GRANS: 65 % (ref 40–75)
HCT VFR BLD AUTO: 44 % (ref 36.6–51.9)
HGB BLD-MCNC: 15.2 G/DL (ref 13.2–17.3)
LYMPHOCYTES, LYMLT: 26 % (ref 20–45)
MCH RBC QN AUTO: 30 PG (ref 26–34)
MCHC RBC AUTO-ENTMCNC: 35 G/DL (ref 31–36)
MCV RBC AUTO: 85 FL (ref 80–95)
MONOCYTES # BLD: 0.5 K/UL (ref 0.1–1)
MONOCYTES NFR BLD: 5 % (ref 3–12)
NEUTROPHILS # BLD AUTO: 5.6 K/UL (ref 1.8–7.7)
PLATELET # BLD AUTO: 288 K/UL (ref 140–440)
PMV BLD AUTO: 10.9 FL (ref 9–13)
RBC # BLD AUTO: 5.15 M/UL (ref 3.8–5.8)
T4 FREE SERPL-MCNC: 1.1 NG/DL (ref 0.9–1.8)
TSH SERPL DL<=0.005 MIU/L-ACNC: 1.01 MCU/ML (ref 0.27–4.2)
WBC # BLD AUTO: 8.6 K/UL (ref 4–11)

## 2018-08-21 NOTE — PROGRESS NOTES
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 HPI   Pt is here c/o increasing daytime fatigue, snoring, and stops breathing at night. Pt is concerned with his thyroid causing all these issues. Advised pt it could be sleep apnea and a sleep study was warranted. Pt will see sleep med but states he will not wear a CPAP because he can only sleep on his stomach. Will also check testosterone and CBC. No Known Allergies    Current Outpatient Prescriptions   Medication Sig    ketorolac (TORADOL) 10 mg tablet Take 1 Tab by mouth every six (6) hours as needed for Pain.  butalbital-acetaminophen (PHRENILIN)  mg tablet Take 1 Tab by mouth every six (6) hours as needed.  promethazine (PHENERGAN) 25 mg tablet Take 1 Tab by mouth every six (6) hours as needed for Nausea.  [START ON 10/1/2018] methylphenidate HCl (RITALIN) 10 mg tablet Take 1 Tab by mouth three (3) times daily. Max Daily Amount: 30 mg.    montelukast (SINGULAIR) 10 mg tablet Take 1 Tab by mouth daily.  omeprazole (PRILOSEC) 40 mg capsule Take 1 Cap by mouth daily.  losartan (COZAAR) 100 mg tablet TAKE 1 TABLET BY MOUTH EVERY DAY    azithromycin (ZITHROMAX Z-KALPANA) 250 mg tablet Take two tablets today then one tablet daily    predniSONE (DELTASONE) 20 mg tablet Take 2 tabs by mouth once daily    [START ON 9/3/2018] methylphenidate HCl (RITALIN) 10 mg tablet Take 1 Tab by mouth three (3) times daily. Max Daily Amount: 30 mg.    methylphenidate HCl (RITALIN) 10 mg tablet Take 1 Tab by mouth three (3) times daily. Max Daily Amount: 30 mg. No current facility-administered medications for this visit. Review of Systems   Constitutional: Positive for malaise/fatigue. Negative for chills and fever. HENT: Negative. Negative for congestion, ear pain, sore throat and tinnitus. Eyes: Negative. Negative for blurred vision, double vision and photophobia.    Respiratory: Negative for cough, shortness of breath and wheezing. Snores and stops breathing in his sleep   Cardiovascular: Negative. Negative for chest pain, palpitations and leg swelling. Gastrointestinal: Positive for heartburn (controlled w meds). Negative for abdominal pain, nausea and vomiting. Genitourinary: Negative. Negative for dysuria, frequency, hematuria and urgency. Musculoskeletal: Negative. Negative for back pain, joint pain, myalgias and neck pain. Skin: Negative. Negative for itching and rash. Neurological: Positive for headaches (not currently). Negative for dizziness, tingling and tremors. Endo/Heme/Allergies: Positive for environmental allergies. Psychiatric/Behavioral: Negative. Negative for depression and memory loss. The patient is not nervous/anxious and does not have insomnia. Visit Vitals    /84 (BP 1 Location: Left arm, BP Patient Position: Sitting)    Pulse 86    Temp 98.5 °F (36.9 °C) (Oral)    Resp 18    Ht 5' 10\" (1.778 m)    Wt 293 lb (132.9 kg)    SpO2 95%    BMI 42.04 kg/m2       Physical Exam   Constitutional: He is oriented to person, place, and time. He appears well-developed and well-nourished. No distress. Pt is obese   HENT:   Head: Normocephalic and atraumatic. Nose: Mucosal edema present. No rhinorrhea. Right sinus exhibits no maxillary sinus tenderness and no frontal sinus tenderness. Left sinus exhibits no maxillary sinus tenderness and no frontal sinus tenderness. Eyes: Pupils are equal, round, and reactive to light. Cardiovascular: Normal rate, regular rhythm and normal heart sounds. Exam reveals no gallop and no friction rub. No murmur heard. Pulmonary/Chest: Effort normal and breath sounds normal. No respiratory distress. He has no wheezes. He has no rales. Neurological: He is alert and oriented to person, place, and time. Skin: Skin is warm and dry. He is not diaphoretic. Psychiatric: He has a normal mood and affect.  His behavior is normal.       ASSESSMENT and PLAN    ICD-10-CM ICD-9-CM    1. Severe obesity (BMI >= 40) ([facility]) E66.01 278.01    2. Malaise R53.81 780.79 TSH 3RD GENERATION      T4, FREE      CBC WITH AUTOMATED DIFF      REFERRAL TO SLEEP STUDIES      TSH 3RD GENERATION      T4, FREE      CBC WITH AUTOMATED DIFF      COLLECTION VENOUS BLOOD,VENIPUNCTURE   3. Snores R06.83 786.09 REFERRAL TO SLEEP STUDIES   4. Excessive daytime sleepiness G47.19 780.54 REFERRAL TO SLEEP STUDIES   5. Has stopped breathing R06.81 786.03 REFERRAL TO SLEEP STUDIES   6. Gastroesophageal reflux disease, esophagitis presence not specified K21.9 530.81 REFERRAL TO GASTROENTEROLOGY     Follow-up Disposition:  Return if symptoms worsen or fail to improve. Pt expressed understanding of visit summary and plans for any follow ups or referrals as well as any medications prescribed. Discussed the patient's BMI with him. The BMI follow up plan is as follows:     dietary management education, guidance, and counseling  encourage exercise  monitor weight  prescribed dietary intake    An After Visit Summary was printed and given to the patient.

## 2018-08-27 ENCOUNTER — TELEPHONE (OUTPATIENT)
Dept: INTERNAL MEDICINE CLINIC | Age: 38
End: 2018-08-27

## 2018-08-27 DIAGNOSIS — J32.9 SINUSITIS, UNSPECIFIED CHRONICITY, UNSPECIFIED LOCATION: Primary | ICD-10-CM

## 2018-08-28 RX ORDER — AZITHROMYCIN 250 MG/1
TABLET, FILM COATED ORAL
Qty: 6 TAB | Refills: 0 | Status: SHIPPED | OUTPATIENT
Start: 2018-08-28 | End: 2018-11-05

## 2018-08-28 RX ORDER — PREDNISONE 20 MG/1
TABLET ORAL
Qty: 10 TAB | Refills: 0 | Status: SHIPPED | OUTPATIENT
Start: 2018-08-28 | End: 2018-11-05 | Stop reason: SDUPTHER

## 2018-08-28 NOTE — TELEPHONE ENCOUNTER
Spoke to pt and gave normal lab results. He did state he is having sinus headaches and the Singulair is not helping.  Can you send him something else in?

## 2018-09-01 DIAGNOSIS — I10 ESSENTIAL HYPERTENSION: ICD-10-CM

## 2018-09-01 NOTE — PATIENT INSTRUCTIONS
Body Mass Index: Care Instructions  Your Care Instructions    Body mass index (BMI) can help you see if your weight is raising your risk for health problems. It uses a formula to compare how much you weigh with how tall you are. · A BMI lower than 18.5 is considered underweight. · A BMI between 18.5 and 24.9 is considered healthy. · A BMI between 25 and 29.9 is considered overweight. A BMI of 30 or higher is considered obese. If your BMI is in the normal range, it means that you have a lower risk for weight-related health problems. If your BMI is in the overweight or obese range, you may be at increased risk for weight-related health problems, such as high blood pressure, heart disease, stroke, arthritis or joint pain, and diabetes. If your BMI is in the underweight range, you may be at increased risk for health problems such as fatigue, lower protection (immunity) against illness, muscle loss, bone loss, hair loss, and hormone problems. BMI is just one measure of your risk for weight-related health problems. You may be at higher risk for health problems if you are not active, you eat an unhealthy diet, or you drink too much alcohol or use tobacco products. Follow-up care is a key part of your treatment and safety. Be sure to make and go to all appointments, and call your doctor if you are having problems. It's also a good idea to know your test results and keep a list of the medicines you take. How can you care for yourself at home? · Practice healthy eating habits. This includes eating plenty of fruits, vegetables, whole grains, lean protein, and low-fat dairy. · If your doctor recommends it, get more exercise. Walking is a good choice. Bit by bit, increase the amount you walk every day. Try for at least 30 minutes on most days of the week. · Do not smoke. Smoking can increase your risk for health problems. If you need help quitting, talk to your doctor about stop-smoking programs and medicines. These can increase your chances of quitting for good. · Limit alcohol to 2 drinks a day for men and 1 drink a day for women. Too much alcohol can cause health problems. If you have a BMI higher than 25  · Your doctor may do other tests to check your risk for weight-related health problems. This may include measuring the distance around your waist. A waist measurement of more than 40 inches in men or 35 inches in women can increase the risk of weight-related health problems. · Talk with your doctor about steps you can take to stay healthy or improve your health. You may need to make lifestyle changes to lose weight and stay healthy, such as changing your diet and getting regular exercise. If you have a BMI lower than 18.5  · Your doctor may do other tests to check your risk for health problems. · Talk with your doctor about steps you can take to stay healthy or improve your health. You may need to make lifestyle changes to gain or maintain weight and stay healthy, such as getting more healthy foods in your diet and doing exercises to build muscle. Where can you learn more? Go to http://tay-efrain.info/. Enter S176 in the search box to learn more about \"Body Mass Index: Care Instructions. \"  Current as of: October 13, 2016  Content Version: 11.4  © 6291-7543 [employer]. Care instructions adapted under license by [employer] (which disclaims liability or warranty for this information). If you have questions about a medical condition or this instruction, always ask your healthcare professional. [address] any warranty or liability for your use of this information.

## 2018-09-04 ENCOUNTER — TELEPHONE (OUTPATIENT)
Dept: INTERNAL MEDICINE CLINIC | Age: 38
End: 2018-09-04

## 2018-09-04 NOTE — TELEPHONE ENCOUNTER
Pt called to inform you that the Z-Simon and Deltasone has helped immensely. Wants to know if he needs a f/u appt?

## 2018-09-04 NOTE — TELEPHONE ENCOUNTER
Spoke to pt and he is feeling better. Told him that it was most likely the steroids that helped with the inflammation in his sinuses. Told him he does not need to follow up at this time. If he starts to feel bad again he will call and we will ask June what she wants to do at that time.

## 2018-09-05 RX ORDER — LOSARTAN POTASSIUM 100 MG/1
TABLET ORAL
Qty: 30 TAB | Refills: 0 | Status: SHIPPED | OUTPATIENT
Start: 2018-09-05 | End: 2018-10-02 | Stop reason: SDUPTHER

## 2018-09-11 ENCOUNTER — LAB ONLY (OUTPATIENT)
Dept: INTERNAL MEDICINE CLINIC | Age: 38
End: 2018-09-11

## 2018-09-11 DIAGNOSIS — E78.00 HYPERCHOLESTEROLEMIA: Primary | ICD-10-CM

## 2018-09-12 LAB
A-G RATIO,AGRAT: 1.7 RATIO (ref 1.1–2.6)
ALBUMIN SERPL-MCNC: 4.5 G/DL (ref 3.5–5)
ALP SERPL-CCNC: 81 U/L (ref 25–115)
ALT SERPL-CCNC: 61 U/L (ref 5–40)
ANION GAP SERPL CALC-SCNC: 17 MMOL/L
AST SERPL W P-5'-P-CCNC: 38 U/L (ref 10–37)
BILIRUB SERPL-MCNC: 0.5 MG/DL (ref 0.2–1.2)
BUN SERPL-MCNC: 12 MG/DL (ref 6–22)
CALCIUM SERPL-MCNC: 9.5 MG/DL (ref 8.4–10.4)
CHLORIDE SERPL-SCNC: 96 MMOL/L (ref 98–110)
CHOLEST SERPL-MCNC: 227 MG/DL (ref 110–200)
CO2 SERPL-SCNC: 26 MMOL/L (ref 20–32)
CREAT SERPL-MCNC: 0.8 MG/DL (ref 0.5–1.2)
GFRAA, 66117: >60
GFRNA, 66118: >60
GLOBULIN,GLOB: 2.7 G/DL (ref 2–4)
GLUCOSE SERPL-MCNC: 153 MG/DL (ref 70–99)
HDLC SERPL-MCNC: 29 MG/DL (ref 40–59)
HDLC SERPL-MCNC: 7.8 MG/DL (ref 0–5)
LDL, DIRECT,DLDL: 88 MG/DL (ref 50–99)
LDLC SERPL CALC-MCNC: ABNORMAL MG/DL (ref 50–99)
POTASSIUM SERPL-SCNC: 4.4 MMOL/L (ref 3.5–5.5)
PROT SERPL-MCNC: 7.2 G/DL (ref 6.4–8.3)
SODIUM SERPL-SCNC: 139 MMOL/L (ref 133–145)
TRIGL SERPL-MCNC: 639 MG/DL (ref 40–149)
VLDLC SERPL CALC-MCNC: 128 MG/DL (ref 8–30)

## 2018-09-13 ENCOUNTER — DOCUMENTATION ONLY (OUTPATIENT)
Dept: INTERNAL MEDICINE CLINIC | Age: 38
End: 2018-09-13

## 2018-09-13 ENCOUNTER — TELEPHONE (OUTPATIENT)
Dept: INTERNAL MEDICINE CLINIC | Age: 38
End: 2018-09-13

## 2018-09-13 DIAGNOSIS — E78.1 HYPERTRIGLYCERIDEMIA: Primary | ICD-10-CM

## 2018-09-13 RX ORDER — ICOSAPENT ETHYL 1000 MG/1
2 CAPSULE ORAL 2 TIMES DAILY WITH MEALS
Qty: 120 CAP | Refills: 3 | Status: SHIPPED | OUTPATIENT
Start: 2018-09-13 | End: 2018-11-05

## 2018-09-13 NOTE — TELEPHONE ENCOUNTER
Pt aware of results. He would like to be referred to a dietician. He will recheck labs in 3 months after starting new medication.

## 2018-09-13 NOTE — TELEPHONE ENCOUNTER
Pt called while nurse was busy. Nurse called pt back and he states he is having abdominal pain. First phone call to pt was to discuss labs, mentioned high Triglycerides could lead to pancreatitis. Pt states he thinks he may have symptoms of pancreatitis after looking it up online and asking for pain medication. Pt states he has pain in his lower left abdomen. Denies constipation, has pain after eating which happened yesterday and today. Pt asked if he needed to be seen or if he could get a prescription. Made pt aware that he would need to be seen as abdominal pain is not something Jammie Bravo treats over the phone. Office closing due to weather and pt was encouraged to go to urgent care if pain continues. Pt voice on phone did not sound as though he was in distress. Pt states he did not want to go to urgent care and spend that kind of money. Told pt if pain persists and increases he needs to be seen. If pain is bearable and he did not go to urgent care while office closed he should make an appointment to see Jammie Bravo next week. Pt quickly said thank you and phone call was ended.

## 2018-09-13 NOTE — TELEPHONE ENCOUNTER
Please advise pt his triglycerides are very high -639. I am going to send an rx to his pharmacy and we should recheck fasting labs in 3 months.

## 2018-09-20 ENCOUNTER — HOSPITAL ENCOUNTER (OUTPATIENT)
Dept: NUTRITION | Age: 38
Discharge: HOME | End: 2018-09-20
Payer: COMMERCIAL

## 2018-09-20 PROCEDURE — 97802 MEDICAL NUTRITION INDIV IN: CPT

## 2018-09-20 NOTE — PROGRESS NOTES
[address] [place] [address] [address], [address] [facility] [address], [place], [address] [facility] [address] Phone: [phone]  Fax: [phone] Nutrition Assessment  Medical Nutrition Therapy Outpatient Initial Evaluation Patient Name: Tello Paez : 1980 Treatment Diagnosis: Hypertriglyceridemia [facility] 2018 Gender: male Age: 41 y.o. Ht: 70 in Wt:  290 lb  kg BMI: 41.6 BMR Male [address] [facility] Female Anthropometrics Assessment: BMI indicates obese III. Past Medical History includes: HLD, high BP, wt gain Pertinent Medications:  
Ritalin, losartan, omeprazole Biochemical Data:  
No results found for: HBA1C, HGBE8, GZT5TVKT, FOY1HFLE Lab Results Component Value Date/Time Cholesterol, total 227 (H) 2018 08:42 AM  
 HDL Cholesterol 29 (L) 2018 08:42 AM  
 LDL,Direct 88 2018 08:42 AM  
 LDL, calculated  2018 08:42 AM  
   Comment:  
   Triglyceride >400. LDL cannot be calculated. LDL Cholesterol Direct has been 
ordered. VLDL, calculated 128 (H) 2018 08:42 AM  
 Triglyceride 639 (H) 2018 08:42 AM  
 
Lab Results Component Value Date/Time ALT (SGPT) 61 (H) 2018 08:42 AM  
 AST (SGOT) 38 (H) 2018 08:42 AM  
 Alk. phosphatase 81 2018 08:42 AM  
 Bilirubin, total 0.5 2018 08:42 AM  
 
Lab Results Component Value Date/Time Creatinine 0.8 2018 08:42 AM  
 
Lab Results Component Value Date/Time BUN 12 2018 08:42 AM  
 
No results found for: MCACR, MCA1, MCA2, MCA3, MCAU, MCAU2, MCALPOCT Subjective/Assessment: 
 Pt in today r/t HLD. Moved here ~1 yr and lives with mother to help with sister (handicapped). Three people live in house now and pt cooks but does not have time to do so. Pt works as a  and has a sedentary job.  He states he used to stay active, even a couple years ago played several sports. He is concerned with his weight and his cholesterol/triglycerides. Current Eating Patterns: Up at 6 am for work but does not eat. No ETOH use. B: ybarra, egg, and cheese biscuit with coffee L: [employer], cheeseburger with water D: skips (goes to bed) Estimate Needs Calories:  1800 Protein: 113 Carbs: 180 Fat: 70  
Kcal/day  g/day  g/day  g/day   
    percent: 25  45  30 Education & Recommendations provided: Discussed the Healthy Plate Method and appropriate portion sizes of different food groups. Explained the importance of combining carbohydrates with protein at meals and reviewed foods that contain each nutrient. Emphasized the importance of consistent meal time intervals throughout the day to improve metabolism. Explained calorie density and empty calories to assist pt with understanding portion sizes and limiting excess calories. Educated patient on all sources of carbohydrates found in foods and  encouraged  no more than 40-50 g/meal and 15-20 g/snack. Encouraged pt not to go longer than 5 hours without eating but to space meals/snacks at least 3 hours apart. Encouraged pt to drink only calorie free or very low calorie/carb beverages during the week. Encouraged pt to limit his meals outside of the house to 5 per week max (includes all meals). Handouts Provided: [x]  Carbohydrates [x]  Protein []  Fiber 
[]  Serving Sizes [x]  Meal and Snack Ideas 
[]  Food Journals []  Diabetes [x]  Cholesterol 
[]  Sodium [x]  Gen Nutr Guidelines 
[]  SBGM Guidelines 
[x]  Others: snacks, non-starchy veggies Information Reviewed with: Pt Readiness to Change Stage: []  Pre-contemplative    []  Contemplative 
[]  Preparation               [x]  Action                  []  Maintenance Potential Barriers to Learning: []  Decline in memory    []  Language barrier   []  Other: 
[]  Emotional                  []  Limited mobility []  Lack of motivation     [] Vision, hearing or cognitive impairment Expected Compliance: Fair due to pt having a good understanding of nutrition education and having the desire to decrease elevated labs and weight. Nutritional Goal - To promote lifestyle changes to result in:   
[x]  Weight loss 
[]  Improved diabetic control [x]  Decreased cholesterol levels [x]  Decreased blood pressure 
[]  Weight maintenance []  Preventing any interactions associated with food allergies []  Adequate weight gain toward goal weight 
[]  Other:  
  
 
Patient Goals: SMART goals 1. Limit CHO's to 40-50 g/meal and 15-20 g/snack 2. Eat within 1-2 hrs of waking and Q 3-4 hrs thereafter 3. Always have at least 1 serving of protein every time you eat 4. No soda during the week (no more than 2 20-oz on weekend) 5. Maximum eating out of house is 5 times/week (including all meals) Dietitian Signature: Radha Bhatia MS, RD Date: 9/20/2018 Follow-up: 10/11/18 Time: 2:24 PM

## 2018-09-27 ENCOUNTER — DOCUMENTATION ONLY (OUTPATIENT)
Dept: INTERNAL MEDICINE CLINIC | Age: 38
End: 2018-09-27

## 2018-10-02 RX ORDER — OMEGA-3-ACID ETHYL ESTERS 1 G/1
2 CAPSULE, LIQUID FILLED ORAL 2 TIMES DAILY WITH MEALS
Qty: 120 CAP | Refills: 3 | Status: SHIPPED | OUTPATIENT
Start: 2018-10-02 | End: 2018-11-05

## 2018-11-05 ENCOUNTER — OFFICE VISIT (OUTPATIENT)
Dept: INTERNAL MEDICINE CLINIC | Age: 38
End: 2018-11-05

## 2018-11-05 VITALS
WEIGHT: 296 LBS | TEMPERATURE: 98.6 F | HEART RATE: 90 BPM | HEIGHT: 70 IN | SYSTOLIC BLOOD PRESSURE: 122 MMHG | RESPIRATION RATE: 18 BRPM | DIASTOLIC BLOOD PRESSURE: 64 MMHG | OXYGEN SATURATION: 97 % | BODY MASS INDEX: 42.37 KG/M2

## 2018-11-05 DIAGNOSIS — J02.9 SORETHROAT: ICD-10-CM

## 2018-11-05 DIAGNOSIS — J32.9 SINUSITIS, UNSPECIFIED CHRONICITY, UNSPECIFIED LOCATION: ICD-10-CM

## 2018-11-05 DIAGNOSIS — J30.9 ALLERGIC RHINITIS, UNSPECIFIED SEASONALITY, UNSPECIFIED TRIGGER: Primary | ICD-10-CM

## 2018-11-05 DIAGNOSIS — R13.10 DYSPHAGIA, UNSPECIFIED TYPE: ICD-10-CM

## 2018-11-05 RX ORDER — PREDNISONE 20 MG/1
TABLET ORAL
Qty: 10 TAB | Refills: 0 | Status: SHIPPED | OUTPATIENT
Start: 2018-11-05 | End: 2019-04-15

## 2018-11-05 NOTE — PATIENT INSTRUCTIONS
Cough: Care Instructions  Your Care Instructions    A cough is your body's response to something that bothers your throat or airways. Many things can cause a cough. You might cough because of a cold or the flu, bronchitis, or asthma. Smoking, postnasal drip, allergies, and stomach acid that backs up into your throat also can cause coughs. A cough is a symptom, not a disease. Most coughs stop when the cause, such as a cold, goes away. You can take a few steps at home to cough less and feel better. Follow-up care is a key part of your treatment and safety. Be sure to make and go to all appointments, and call your doctor if you are having problems. It's also a good idea to know your test results and keep a list of the medicines you take. How can you care for yourself at home? · Drink lots of water and other fluids. This helps thin the mucus and soothes a dry or sore throat. Honey or lemon juice in hot water or tea may ease a dry cough. · Take cough medicine as directed by your doctor. · Prop up your head on pillows to help you breathe and ease a dry cough. · Try cough drops to soothe a dry or sore throat. Cough drops don't stop a cough. Medicine-flavored cough drops are no better than candy-flavored drops or hard candy. · Do not smoke. Avoid secondhand smoke. If you need help quitting, talk to your doctor about stop-smoking programs and medicines. These can increase your chances of quitting for good. When should you call for help? Call 911 anytime you think you may need emergency care.  For example, call if:    · You have severe trouble breathing.    Call your doctor now or seek immediate medical care if:    · You cough up blood.     · You have new or worse trouble breathing.     · You have a new or higher fever.     · You have a new rash.    Watch closely for changes in your health, and be sure to contact your doctor if:    · You cough more deeply or more often, especially if you notice more mucus or a change in the color of your mucus.     · You have new symptoms, such as a sore throat, an earache, or sinus pain.     · You do not get better as expected. Where can you learn more? Go to http://tay-efrian.info/. Enter D279 in the search box to learn more about \"Cough: Care Instructions. \"  Current as of: December 6, 2017  Content Version: 11.8  © 4976-0296 [employer]. Care instructions adapted under license by [employer] (which disclaims liability or warranty for this information). If you have questions about a medical condition or this instruction, always ask your healthcare professional. [address] any warranty or liability for your use of this information.

## 2018-11-05 NOTE — PROGRESS NOTES
Chief Complaint   Patient presents with   [facility]     feels like its going to close off when he coughs   1. Have you been to the ER, urgent care clinic since your last visit? Hospitalized since your last visit? No    2. Have you seen or consulted any other health care providers outside of the [address] [place] [address] since your last visit? Include any pap smears or colon screening.  No

## 2018-11-06 NOTE — PROGRESS NOTES
HISTORY OF PRESENT ILLNESS  Sreekanth Evangelista is a 41 y.o. male. HPI   Pt is here for [facility] with occasional cough and congestion. He states that he will cough and then he has trouble swallowing and breathing. Denies wheezing, CP, SOB, fever, chills, myalgias, malaise, and worsening reflux. Pt states singulair previously given did not help however the prednisone did help     No Known Allergies    Current Outpatient Medications   Medication Sig    predniSONE (DELTASONE) 20 mg tablet Take 2 tabs by mouth once daily    losartan (COZAAR) 100 mg tablet TAKE 1 TABLET BY MOUTH EVERY DAY    ketorolac (TORADOL) 10 mg tablet Take 1 Tab by mouth every six (6) hours as needed for Pain.  butalbital-acetaminophen (PHRENILIN)  mg tablet Take 1 Tab by mouth every six (6) hours as needed.  methylphenidate HCl (RITALIN) 10 mg tablet Take 1 Tab by mouth three (3) times daily. Max Daily Amount: 30 mg.    montelukast (SINGULAIR) 10 mg tablet Take 1 Tab by mouth daily.  omeprazole (PRILOSEC) 40 mg capsule Take 1 Cap by mouth daily.  methylphenidate HCl (RITALIN) 10 mg tablet Take 1 Tab by mouth three (3) times daily. Max Daily Amount: 30 mg.    methylphenidate HCl (RITALIN) 10 mg tablet Take 1 Tab by mouth three (3) times daily. Max Daily Amount: 30 mg. No current facility-administered medications for this visit. Review of Systems   Constitutional: Negative. Negative for chills, fever and malaise/fatigue. HENT: Positive for congestion and sore throat. Negative for ear pain and tinnitus. Trouble swallowing   Eyes: Negative. Negative for blurred vision, double vision and photophobia. Respiratory: Negative. Negative for cough, shortness of breath and wheezing. Cardiovascular: Negative. Negative for chest pain, palpitations and leg swelling. Gastrointestinal: Positive for heartburn (controlled w meds). Negative for abdominal pain, nausea and vomiting. Genitourinary: Negative. Negative for dysuria, frequency, hematuria and urgency. Musculoskeletal: Negative. Negative for back pain, joint pain, myalgias and neck pain. Skin: Negative. Negative for itching and rash. Neurological: Positive for headaches (not currently). Negative for dizziness, tingling and tremors. Endo/Heme/Allergies: Positive for environmental allergies. Psychiatric/Behavioral: Negative. Negative for depression and memory loss. The patient is not nervous/anxious and does not have insomnia. Visit Vitals  /64   Pulse 90   Temp 98.6 °F (37 °C) (Oral)   Resp 18   Ht 5' 10\" (1.778 m)   Wt 296 lb (134.3 kg)   SpO2 97%   BMI 42.47 kg/m²       Physical Exam   Constitutional: He is oriented to person, place, and time. He appears well-developed and well-nourished. No distress. Pt is obese   HENT:   Head: Normocephalic and atraumatic. Nose: Mucosal edema present. No rhinorrhea. Right sinus exhibits no maxillary sinus tenderness and no frontal sinus tenderness. Left sinus exhibits no maxillary sinus tenderness and no frontal sinus tenderness. Eyes: Pupils are equal, round, and reactive to light. Cardiovascular: Normal rate, regular rhythm and normal heart sounds. Exam reveals no gallop and no friction rub. No murmur heard. Pulmonary/Chest: Effort normal and breath sounds normal. No respiratory distress. He has no wheezes. He has no rales. Neurological: He is alert and oriented to person, place, and time. Skin: Skin is warm and dry. He is not diaphoretic. Psychiatric: He has a normal mood and affect. His behavior is normal.       ASSESSMENT and PLAN    ICD-10-CM ICD-9-CM    1. Allergic rhinitis, unspecified seasonality, unspecified trigger J30.9 477.9 REFERRAL TO ENT-OTOLARYNGOLOGY   2. Sorethroat J02.9 462 REFERRAL TO ENT-OTOLARYNGOLOGY   3. Dysphagia, unspecified type R13.10 787.20 REFERRAL TO ENT-OTOLARYNGOLOGY   4.  Sinusitis, unspecified chronicity, unspecified location J32.9 473.9 predniSONE (DELTASONE) 20 mg tablet     Follow-up Disposition:  Return if symptoms worsen or fail to improve. Pt expressed understanding of visit summary and plans for any follow ups or referrals as well as any medications prescribed.

## 2018-11-15 ENCOUNTER — HOSPITAL ENCOUNTER (OUTPATIENT)
Dept: NUTRITION | Age: 38
Discharge: HOME | End: 2018-11-15
Payer: COMMERCIAL

## 2018-11-15 PROCEDURE — 97803 MED NUTRITION INDIV SUBSEQ: CPT

## 2018-11-15 NOTE — PROGRESS NOTES
NUTRITION  FOLLOW-UP TREATMENT NOTE Patient Name: Zachary Oreilly         Date: 11/15/2018 : 1980    YES/NO Patient  Verified Diagnosis: HLD, obesity In time:   4:00             Out time:   4:30 Total Treatment Time (min):   30 min [id number] Changes in medication or medical history? Any new allergies, surgeries or procedures? YES    If yes, update Summary List  
Pt was taking steroids for 4 days for issues with throat. Pt in today for follow up. He hurt his back and was out of commission for 2 weeks. During that time, he was not able to exercise or get out of bed to cook. He went back to skipping meals and then eating fast food more often due to lack of preparation. He is ready to get back on track and believes he will be able to continue with exercise this weekend. He is still working to move out of his current environment. He has been trying to stay away from sugar (soda sporadically), and has found healthy frozen meals that he likes. He wants to start his nutrition plan from the beginning. Current Wt: 293.6 Previous Wt: 289. 6 Wt Change: +4# Achievement of Goals: 1. Add protein to all meals and snacks (list discussed and provided) = not met, see below 2. Track food/beverages into [employer] 3-4 days/wk = not met, discontinue (Pt was out of commission and did not meet goals) Patient Education:  [x]  Review current plan with patient  
[]  Other:   
Handouts/ Information Provided: []  Carbohydrates 
[]  Protein []  Fiber 
[]  Serving Sizes []  Fluids 
[]  General guidelines []  Diabetes []  Cholesterol 
[]  Sodium []  SBGM []  Food Journals 
[]  Others:  
New Patient Goals: Previous goals to include limiting CHO's to 40-50 g/meal, meal timing, protein at every meal, and limiting fast food (No exercise goal this time as pt's back is not fully healed) PLAN [x]  Continue on current plan []  Follow-up PRN  
[]  Discharge due to :   
 [x]  Next appt: 11/29/18 @ 8 am  
 
Dietitian: Araseli Reyes MS, RD Date: 11/15/2018 Time: 4:13 PM

## 2018-12-13 ENCOUNTER — OFFICE VISIT (OUTPATIENT)
Dept: INTERNAL MEDICINE CLINIC | Age: 38
End: 2018-12-13

## 2018-12-13 VITALS
OXYGEN SATURATION: 96 % | TEMPERATURE: 97.8 F | DIASTOLIC BLOOD PRESSURE: 90 MMHG | SYSTOLIC BLOOD PRESSURE: 145 MMHG | BODY MASS INDEX: 42.09 KG/M2 | RESPIRATION RATE: 18 BRPM | WEIGHT: 294 LBS | HEIGHT: 70 IN | HEART RATE: 86 BPM

## 2018-12-13 DIAGNOSIS — F98.8 ATTENTION DEFICIT DISORDER (ADD) WITHOUT HYPERACTIVITY: ICD-10-CM

## 2018-12-13 DIAGNOSIS — J40 BRONCHITIS: ICD-10-CM

## 2018-12-13 DIAGNOSIS — R05.9 COUGH: Primary | ICD-10-CM

## 2018-12-13 RX ORDER — PROMETHAZINE HYDROCHLORIDE AND CODEINE PHOSPHATE 6.25; 1 MG/5ML; MG/5ML
1 SOLUTION ORAL
Qty: 180 ML | Refills: 0 | Status: SHIPPED | OUTPATIENT
Start: 2018-12-13 | End: 2019-04-15

## 2018-12-13 RX ORDER — METHYLPHENIDATE HYDROCHLORIDE 10 MG/1
10 TABLET ORAL 3 TIMES DAILY
Qty: 90 TAB | Refills: 0 | Status: SHIPPED | OUTPATIENT
Start: 2019-02-07 | End: 2019-04-29 | Stop reason: SDUPTHER

## 2018-12-13 RX ORDER — METHYLPHENIDATE HYDROCHLORIDE 10 MG/1
10 TABLET ORAL 3 TIMES DAILY
Qty: 90 TAB | Refills: 0 | Status: SHIPPED | OUTPATIENT
Start: 2019-01-10 | End: 2019-04-29 | Stop reason: SDUPTHER

## 2018-12-13 RX ORDER — AMOXICILLIN AND CLAVULANATE POTASSIUM 875; 125 MG/1; MG/1
1 TABLET, FILM COATED ORAL 2 TIMES DAILY
Qty: 20 TAB | Refills: 0 | Status: SHIPPED | OUTPATIENT
Start: 2018-12-13 | End: 2018-12-23

## 2018-12-13 RX ORDER — METHYLPHENIDATE HYDROCHLORIDE 10 MG/1
10 TABLET ORAL 3 TIMES DAILY
Qty: 90 TAB | Refills: 0 | Status: SHIPPED | OUTPATIENT
Start: 2018-12-13 | End: 2019-04-29 | Stop reason: SDUPTHER

## 2018-12-13 NOTE — PROGRESS NOTES
Chief Complaint   Patient presents with    Cold Symptoms     Been coughing for a long time, feels congested in chest but cannot cough up phlegm. 1. Have you been to the ER, urgent care clinic since your last visit? Hospitalized since your last visit? No    2. Have you seen or consulted any other health care providers outside of the [address] [place] [address] since your last visit? Include any pap smears or colon screening.  Yes When: GI    PHQ over the last two weeks 12/13/2018   Little interest or pleasure in doing things Not at all   Feeling down, depressed, irritable, or hopeless Not at all   Total Score PHQ 2 0

## 2018-12-13 NOTE — PATIENT INSTRUCTIONS
Bronchitis: Care Instructions  Your Care Instructions    Bronchitis is inflammation of the bronchial tubes, which carry air to the lungs. The tubes swell and produce mucus, or phlegm. The mucus and inflamed bronchial tubes make you cough. You may have trouble breathing. Most cases of bronchitis are caused by viruses like those that cause colds. Antibiotics usually do not help and they may be harmful. Bronchitis usually develops rapidly and lasts about 2 to 3 weeks in otherwise healthy people. Follow-up care is a key part of your treatment and safety. Be sure to make and go to all appointments, and call your doctor if you are having problems. It's also a good idea to know your test results and keep a list of the medicines you take. How can you care for yourself at home? · Take all medicines exactly as prescribed. Call your doctor if you think you are having a problem with your medicine. · Get some extra rest.  · Take an over-the-counter pain medicine, such as acetaminophen (Tylenol), ibuprofen (Advil, Motrin), or naproxen (Aleve) to reduce fever and relieve body aches. Read and follow all instructions on the label. · Do not take two or more pain medicines at the same time unless the doctor told you to. Many pain medicines have acetaminophen, which is Tylenol. Too much acetaminophen (Tylenol) can be harmful. · Take an over-the-counter cough medicine that contains dextromethorphan to help quiet a dry, hacking cough so that you can sleep. Avoid cough medicines that have more than one active ingredient. Read and follow all instructions on the label. · Breathe moist air from a humidifier, hot shower, or sink filled with hot water. The heat and moisture will thin mucus so you can cough it out. · Do not smoke. Smoking can make bronchitis worse. If you need help quitting, talk to your doctor about stop-smoking programs and medicines. These can increase your chances of quitting for good.   When should you call for help? Call 911 anytime you think you may need emergency care. For example, call if:    · You have severe trouble breathing.    Call your doctor now or seek immediate medical care if:    · You have new or worse trouble breathing.     · You cough up dark brown or bloody mucus (sputum).     · You have a new or higher fever.     · You have a new rash.    Watch closely for changes in your health, and be sure to contact your doctor if:    · You cough more deeply or more often, especially if you notice more mucus or a change in the color of your mucus.     · You are not getting better as expected. Where can you learn more? Go to http://tay-efrain.info/. Enter H333 in the search box to learn more about \"Bronchitis: Care Instructions. \"  Current as of: December 6, 2017  Content Version: 11.8  © 1956-8994 [employer]. Care instructions adapted under license by [employer] (which disclaims liability or warranty for this information). If you have questions about a medical condition or this instruction, always ask your healthcare professional. [address] any warranty or liability for your use of this information.

## 2018-12-13 NOTE — PROGRESS NOTES
HISTORY OF PRESENT ILLNESS  Loli Harding is a 41 y.o. male. HPI   Pt is here for congestion and congestion for the past month. He was seen last month and given a z pack and prednisone and it improved some but never went away. He returned and was advised to start antihistamine which did not help. He was at GI last week and they were unable to do endoscopy bc he had too much mucous. They also advised him to take antihistamine and are going to try again next week however wanted him to have CXR. Denies fever, chills, HA, otalgia, dizziness, rash, wheezing, SOB, palpitations, edema, weight changes, and ST. He also would like a refill on his adderall. He states it is working well and he is able to focus better at work. Denies insomnia, anxiety, HA, and weight changes. He is working on diet and lifestyle changes for weight as well. No Known Allergies    Current Outpatient Medications   Medication Sig    amoxicillin-clavulanate (AUGMENTIN) 875-125 mg per tablet Take 1 Tab by mouth two (2) times a day for 10 days.  promethazine-codeine (PHENERGAN WITH CODEINE) 6.25-10 mg/5 mL syrup Take 5 mL by mouth every six (6) hours as needed for Cough. Max Daily Amount: 20 mL.  [START ON 2/7/2019] methylphenidate HCl (RITALIN) 10 mg tablet Take 1 Tab by mouth three (3) times daily. Max Daily Amount: 30 mg.    [START ON 1/10/2019] methylphenidate HCl (RITALIN) 10 mg tablet Take 1 Tab by mouth three (3) times daily. Max Daily Amount: 30 mg.    methylphenidate HCl (RITALIN) 10 mg tablet Take 1 Tab by mouth three (3) times daily. Max Daily Amount: 30 mg.    losartan (COZAAR) 100 mg tablet TAKE 1 TABLET BY MOUTH EVERY DAY    ketorolac (TORADOL) 10 mg tablet Take 1 Tab by mouth every six (6) hours as needed for Pain.  butalbital-acetaminophen (PHRENILIN)  mg tablet Take 1 Tab by mouth every six (6) hours as needed.  omeprazole (PRILOSEC) 40 mg capsule Take 1 Cap by mouth daily.     predniSONE (DELTASONE) 20 mg tablet Take 2 tabs by mouth once daily    montelukast (SINGULAIR) 10 mg tablet Take 1 Tab by mouth daily. No current facility-administered medications for this visit. ROS  Visit Vitals  /90 (BP 1 Location: Left arm, BP Patient Position: Sitting)   Pulse 86   Temp 97.8 °F (36.6 °C) (Oral)   Resp 18   Ht 5' 10\" (1.778 m)   Wt 294 lb (133.4 kg)   SpO2 96%   BMI 42.18 kg/m²       Physical Exam   Constitutional: He is oriented to person, place, and time. He appears well-developed and well-nourished. No distress. Pt is obese   HENT:   Head: Normocephalic and atraumatic. Right Ear: Tympanic membrane, external ear and ear canal normal.   Left Ear: Tympanic membrane, external ear and ear canal normal.   Nose: Mucosal edema present. No rhinorrhea. Right sinus exhibits no maxillary sinus tenderness and no frontal sinus tenderness. Left sinus exhibits no maxillary sinus tenderness and no frontal sinus tenderness. Mouth/Throat: Uvula is midline and mucous membranes are normal. Posterior oropharyngeal erythema present. No oropharyngeal exudate or posterior oropharyngeal edema. Eyes: Pupils are equal, round, and reactive to light. Cardiovascular: Normal rate, regular rhythm and normal heart sounds. Exam reveals no gallop and no friction rub. No murmur heard. Pulmonary/Chest: Effort normal and breath sounds normal. No respiratory distress. He has no wheezes. He has no rales. Neurological: He is alert and oriented to person, place, and time. Skin: Skin is warm and dry. He is not diaphoretic. Psychiatric: He has a normal mood and affect. His behavior is normal.       ASSESSMENT and PLAN    ICD-10-CM ICD-9-CM    1. Cough R05 786.2 XR CHEST PA LAT      promethazine-codeine (PHENERGAN WITH CODEINE) 6.25-10 mg/5 mL syrup   2. Bronchitis J40 490 amoxicillin-clavulanate (AUGMENTIN) 875-125 mg per tablet   3.  Attention deficit disorder (ADD) without hyperactivity F98.8 314.00 methylphenidate HCl (RITALIN) 10 mg tablet      methylphenidate HCl (RITALIN) 10 mg tablet      methylphenidate HCl (RITALIN) 10 mg tablet     Follow-up Disposition:  Return in about 3 months (around 3/13/2019) for ADD. Pt expressed understanding of visit summary and plans for any follow ups or referrals as well as any medications prescribed.

## 2019-04-15 ENCOUNTER — OFFICE VISIT (OUTPATIENT)
Dept: INTERNAL MEDICINE CLINIC | Age: 39
End: 2019-04-15

## 2019-04-15 VITALS
DIASTOLIC BLOOD PRESSURE: 90 MMHG | TEMPERATURE: 98.3 F | OXYGEN SATURATION: 96 % | RESPIRATION RATE: 18 BRPM | BODY MASS INDEX: 41.66 KG/M2 | SYSTOLIC BLOOD PRESSURE: 130 MMHG | HEIGHT: 70 IN | HEART RATE: 90 BPM | WEIGHT: 291 LBS

## 2019-04-15 DIAGNOSIS — E66.01 OBESITY, MORBID (HCC): ICD-10-CM

## 2019-04-15 DIAGNOSIS — I10 ESSENTIAL HYPERTENSION: Primary | ICD-10-CM

## 2019-04-15 DIAGNOSIS — R73.9 ELEVATED BLOOD SUGAR: ICD-10-CM

## 2019-04-15 NOTE — PROGRESS NOTES
HISTORY OF PRESENT ILLNESS  Patrizia Hernandez is a 41 y.o. male. HPI   Pt is here refor f/u on his BP. He is not fasting for labs so will tomas order to have done at [facility]. He was in ER in Jan for bronchitis and his BS was elevated so will check A1c as well. 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 No Known Allergies    Current Outpatient Medications   Medication Sig    methylphenidate HCl (RITALIN) 10 mg tablet Take 1 Tab by mouth three (3) times daily. Max Daily Amount: 30 mg.    methylphenidate HCl (RITALIN) 10 mg tablet Take 1 Tab by mouth three (3) times daily. Max Daily Amount: 30 mg.    methylphenidate HCl (RITALIN) 10 mg tablet Take 1 Tab by mouth three (3) times daily. Max Daily Amount: 30 mg.    losartan (COZAAR) 100 mg tablet TAKE 1 TABLET BY MOUTH EVERY DAY    butalbital-acetaminophen (PHRENILIN)  mg tablet Take 1 Tab by mouth every six (6) hours as needed.  montelukast (SINGULAIR) 10 mg tablet Take 1 Tab by mouth daily.  omeprazole (PRILOSEC) 40 mg capsule Take 1 Cap by mouth daily. No current facility-administered medications for this visit. Review of Systems   Constitutional: Negative. Negative for chills, fever and malaise/fatigue. HENT: Negative. Negative for congestion, ear pain, sore throat and tinnitus. Eyes: Negative. Negative for blurred vision, double vision and photophobia. Respiratory: Negative. Negative for cough, shortness of breath and wheezing. Cardiovascular: Negative. Negative for chest pain, palpitations and leg swelling. Gastrointestinal: Positive for heartburn (controlled w meds). Negative for abdominal pain, nausea and vomiting. Genitourinary: Negative. Negative for dysuria, frequency, hematuria and urgency. Musculoskeletal: Negative. Negative for back pain, joint pain, myalgias and neck pain. Skin: Negative.   Negative for itching and rash.   Neurological: Positive for headaches (not currently). Negative for dizziness, tingling and tremors. Endo/Heme/Allergies: Positive for environmental allergies. Psychiatric/Behavioral: Negative. Negative for depression and memory loss. The patient is not nervous/anxious and does not have insomnia. Visit Vitals  /90   Pulse 90   Temp 98.3 °F (36.8 °C) (Oral)   Resp 18   Ht 5' 10\" (1.778 m)   Wt 291 lb (132 kg)   SpO2 96%   BMI 41.75 kg/m²       Physical Exam   Constitutional: He is oriented to person, place, and time. He appears well-developed and well-nourished. No distress. Pt is obese   HENT:   Head: Normocephalic and atraumatic. Nose: Mucosal edema present. No rhinorrhea. Right sinus exhibits no maxillary sinus tenderness and no frontal sinus tenderness. Left sinus exhibits no maxillary sinus tenderness and no frontal sinus tenderness. Cardiovascular: Normal rate, regular rhythm and normal heart sounds. Exam reveals no gallop and no friction rub. No murmur heard. Pulmonary/Chest: Effort normal and breath sounds normal. No respiratory distress. He has no wheezes. He has no rales. Neurological: He is alert and oriented to person, place, and time. Skin: Skin is warm and dry. He is not diaphoretic. Psychiatric: He has a normal mood and affect. His behavior is normal.       ASSESSMENT and PLAN    ICD-10-CM ICD-9-CM    1. Essential hypertension I24 336.3 METABOLIC PANEL, COMPREHENSIVE      CBC W/O DIFF      LIPID PANEL   2. Obesity, morbid (HCC) E66.01 278.01 CBC W/O DIFF      LIPID PANEL   3. Elevated blood sugar R73.9 790.29 CBC W/O DIFF      HEMOGLOBIN A1C WITH EAG     Follow-up and Dispositions    · Return in about 6 months (around 10/15/2019) for htn. Pt expressed understanding of visit summary and plans for any follow ups or referrals as well as any medications prescribed.

## 2019-04-15 NOTE — PROGRESS NOTES
Chief Complaint   Patient presents with    Hypertension   1. Have you been to the ER, urgent care clinic since your last visit? Hospitalized since your last visit? No    2. Have you seen or consulted any other health care providers outside of the [address] [place] [address] since your last visit? Include any pap smears or colon screening.  No

## 2019-04-15 NOTE — PATIENT INSTRUCTIONS
DASH Diet: Care Instructions  Your Care Instructions    The DASH diet is an eating plan that can help lower your blood pressure. DASH stands for Dietary Approaches to Stop Hypertension. Hypertension is high blood pressure. The DASH diet focuses on eating foods that are high in calcium, potassium, and magnesium. These nutrients can lower blood pressure. The foods that are highest in these nutrients are fruits, vegetables, low-fat dairy products, nuts, seeds, and legumes. But taking calcium, potassium, and magnesium supplements instead of eating foods that are high in those nutrients does not have the same effect. The DASH diet also includes whole grains, fish, and poultry. The DASH diet is one of several lifestyle changes your doctor may recommend to lower your high blood pressure. Your doctor may also want you to decrease the amount of sodium in your diet. Lowering sodium while following the DASH diet can lower blood pressure even further than just the DASH diet alone. Follow-up care is a key part of your treatment and safety. Be sure to make and go to all appointments, and call your doctor if you are having problems. It's also a good idea to know your test results and keep a list of the medicines you take. How can you care for yourself at home? Following the DASH diet  · Eat 4 to 5 servings of fruit each day. A serving is 1 medium-sized piece of fruit, ½ cup chopped or canned fruit, 1/4 cup dried fruit, or 4 ounces (½ cup) of fruit juice. Choose fruit more often than fruit juice. · Eat 4 to 5 servings of vegetables each day. A serving is 1 cup of lettuce or raw leafy vegetables, ½ cup of chopped or cooked vegetables, or 4 ounces (½ cup) of vegetable juice. Choose vegetables more often than vegetable juice. · Get 2 to 3 servings of low-fat and fat-free dairy each day. A serving is 8 ounces of milk, 1 cup of yogurt, or 1 ½ ounces of cheese. · Eat 6 to 8 servings of grains each day.  A serving is 1 slice of bread, 1 ounce of dry cereal, or ½ cup of cooked rice, pasta, or cooked cereal. Try to choose whole-grain products as much as possible. · Limit lean meat, poultry, and fish to 2 servings each day. A serving is 3 ounces, about the size of a deck of cards. · Eat 4 to 5 servings of nuts, seeds, and legumes (cooked dried beans, lentils, and split peas) each week. A serving is 1/3 cup of nuts, 2 tablespoons of seeds, or ½ cup of cooked beans or peas. · Limit fats and oils to 2 to 3 servings each day. A serving is 1 teaspoon of vegetable oil or 2 tablespoons of salad dressing. · Limit sweets and added sugars to 5 servings or less a week. A serving is 1 tablespoon jelly or jam, ½ cup sorbet, or 1 cup of lemonade. · Eat less than 2,300 milligrams (mg) of sodium a day. If you limit your sodium to 1,500 mg a day, you can lower your blood pressure even more. Tips for success  · Start small. Do not try to make dramatic changes to your diet all at once. You might feel that you are missing out on your favorite foods and then be more likely to not follow the plan. Make small changes, and stick with them. Once those changes become habit, add a few more changes. · Try some of the following:  ? Make it a goal to eat a fruit or vegetable at every meal and at snacks. This will make it easy to get the recommended amount of fruits and vegetables each day. ? Try yogurt topped with fruit and nuts for a snack or healthy dessert. ? Add lettuce, tomato, cucumber, and onion to sandwiches. ? Combine a ready-made pizza crust with low-fat mozzarella cheese and lots of vegetable toppings. Try using tomatoes, squash, spinach, broccoli, carrots, cauliflower, and onions. ? Have a variety of cut-up vegetables with a low-fat dip as an appetizer instead of chips and dip. ? Sprinkle sunflower seeds or chopped almonds over salads. Or try adding chopped walnuts or almonds to cooked vegetables.   ? Try some vegetarian meals using beans and peas. Add garbanzo or kidney beans to salads. Make burritos and tacos with mashed lopez beans or black beans. Where can you learn more? Go to http://tay-efrain.info/. Enter H316 in the search box to learn more about \"DASH Diet: Care Instructions. \"  Current as of: July 22, 2018  Content Version: 11.9  © 3059-7544 [employer]. Care instructions adapted under license by [employer] (which disclaims liability or warranty for this information). If you have questions about a medical condition or this instruction, always ask your healthcare professional. [address] any warranty or liability for your use of this information.

## 2019-04-29 ENCOUNTER — OFFICE VISIT (OUTPATIENT)
Dept: INTERNAL MEDICINE CLINIC | Age: 39
End: 2019-04-29

## 2019-04-29 VITALS
OXYGEN SATURATION: 96 % | HEART RATE: 93 BPM | SYSTOLIC BLOOD PRESSURE: 139 MMHG | TEMPERATURE: 97.7 F | DIASTOLIC BLOOD PRESSURE: 90 MMHG | BODY MASS INDEX: 41.95 KG/M2 | RESPIRATION RATE: 18 BRPM | HEIGHT: 70 IN | WEIGHT: 293 LBS

## 2019-04-29 DIAGNOSIS — F98.8 ATTENTION DEFICIT DISORDER (ADD) WITHOUT HYPERACTIVITY: ICD-10-CM

## 2019-04-29 DIAGNOSIS — J30.9 ALLERGIC RHINITIS, UNSPECIFIED SEASONALITY, UNSPECIFIED TRIGGER: ICD-10-CM

## 2019-04-29 DIAGNOSIS — G43.909 MIGRAINE WITHOUT STATUS MIGRAINOSUS, NOT INTRACTABLE, UNSPECIFIED MIGRAINE TYPE: Primary | ICD-10-CM

## 2019-04-29 DIAGNOSIS — R11.0 NAUSEA: ICD-10-CM

## 2019-04-29 RX ORDER — METHYLPHENIDATE HYDROCHLORIDE 10 MG/1
10 TABLET ORAL 3 TIMES DAILY
Qty: 90 TAB | Refills: 0 | Status: SHIPPED | OUTPATIENT
Start: 2019-05-27

## 2019-04-29 RX ORDER — MONTELUKAST SODIUM 10 MG/1
10 TABLET ORAL DAILY
Qty: 30 TAB | Refills: 11 | Status: SHIPPED | OUTPATIENT
Start: 2019-04-29

## 2019-04-29 RX ORDER — PROMETHAZINE HYDROCHLORIDE 25 MG/1
25 TABLET ORAL
Qty: 30 TAB | Refills: 1 | Status: SHIPPED | OUTPATIENT
Start: 2019-04-29 | End: 2019-12-26 | Stop reason: SDUPTHER

## 2019-04-29 RX ORDER — METHYLPHENIDATE HYDROCHLORIDE 10 MG/1
10 TABLET ORAL 3 TIMES DAILY
Qty: 90 TAB | Refills: 0 | Status: SHIPPED | OUTPATIENT
Start: 2019-06-24

## 2019-04-29 RX ORDER — METHYLPHENIDATE HYDROCHLORIDE 10 MG/1
10 TABLET ORAL 3 TIMES DAILY
Qty: 90 TAB | Refills: 0 | Status: SHIPPED | OUTPATIENT
Start: 2019-04-29

## 2019-04-29 RX ORDER — PROMETHAZINE HYDROCHLORIDE 25 MG/1
25 TABLET ORAL
COMMUNITY
End: 2019-04-29 | Stop reason: SDUPTHER

## 2019-04-29 NOTE — PROGRESS NOTES
HISTORY OF PRESENT ILLNESS  Angella Cruz is a 41 y.o. male. HPI   Pt is here for f/u on his ADD and med refills. He also needs a refill on phenergan for his nausea that is assoc with migraines. There has been no change in his migraines or ADD. Pt also has been having seasonal allergy congestion and says the xyzal causes him to feel drowsy. He is wondering if there is anything else to try that will not make him drowsy. No Known Allergies    Current Outpatient Medications   Medication Sig    [START ON 6/24/2019] methylphenidate HCl (RITALIN) 10 mg tablet Take 1 Tab by mouth three (3) times daily. Max Daily Amount: 30 mg.    [START ON 5/27/2019] methylphenidate HCl (RITALIN) 10 mg tablet Take 1 Tab by mouth three (3) times daily. Max Daily Amount: 30 mg.    methylphenidate HCl (RITALIN) 10 mg tablet Take 1 Tab by mouth three (3) times daily. Max Daily Amount: 30 mg.    montelukast (SINGULAIR) 10 mg tablet Take 1 Tab by mouth daily.  promethazine (PHENERGAN) 25 mg tablet Take 1 Tab by mouth every six (6) hours as needed for Nausea.  losartan (COZAAR) 100 mg tablet TAKE 1 TABLET BY MOUTH EVERY DAY    butalbital-acetaminophen (PHRENILIN)  mg tablet Take 1 Tab by mouth every six (6) hours as needed.  omeprazole (PRILOSEC) 40 mg capsule Take 1 Cap by mouth daily. No current facility-administered medications for this visit. Review of Systems   Constitutional: Negative. Negative for chills, fever and malaise/fatigue. HENT: Negative. Negative for congestion, ear pain, sore throat and tinnitus. Eyes: Negative. Negative for blurred vision, double vision and photophobia. Respiratory: Negative. Negative for cough, shortness of breath and wheezing. Cardiovascular: Negative. Negative for chest pain, palpitations and leg swelling. Gastrointestinal: Positive for heartburn (controlled w meds). Negative for abdominal pain, nausea and vomiting. Genitourinary: Negative.   Negative for dysuria, frequency, hematuria and urgency. Musculoskeletal: Negative. Negative for back pain, joint pain, myalgias and neck pain. Skin: Negative. Negative for itching and rash. Neurological: Positive for headaches (started this AM - drank a coke and took motrin and improving). Negative for dizziness, tingling and tremors. Endo/Heme/Allergies: Positive for environmental allergies. Psychiatric/Behavioral: Negative. Negative for depression and memory loss. The patient is not nervous/anxious and does not have insomnia. Visit Vitals  /90 (BP 1 Location: Left arm, BP Patient Position: Sitting)   Pulse 93   Temp 97.7 °F (36.5 °C) (Oral)   Resp 18   Ht 5' 10\" (1.778 m)   Wt 293 lb (132.9 kg)   SpO2 96%   BMI 42.04 kg/m²       Physical Exam   Constitutional: He is oriented to person, place, and time. He appears well-developed and well-nourished. No distress. Pt is obese   HENT:   Head: Normocephalic and atraumatic. Right Ear: Tympanic membrane, external ear and ear canal normal.   Left Ear: Tympanic membrane, external ear and ear canal normal.   Nose: Mucosal edema present. No rhinorrhea. Right sinus exhibits no maxillary sinus tenderness and no frontal sinus tenderness. Left sinus exhibits no maxillary sinus tenderness and no frontal sinus tenderness. Mouth/Throat: Uvula is midline, oropharynx is clear and moist and mucous membranes are normal.   Cardiovascular: Normal rate, regular rhythm and normal heart sounds. Exam reveals no gallop and no friction rub. No murmur heard. Pulmonary/Chest: Effort normal and breath sounds normal. No respiratory distress. He has no wheezes. He has no rales. Neurological: He is alert and oriented to person, place, and time. Skin: Skin is warm and dry. He is not diaphoretic. Psychiatric: He has a normal mood and affect. His behavior is normal.       ASSESSMENT and PLAN    ICD-10-CM ICD-9-CM    1.  Migraine without status migrainosus, not intractable, unspecified migraine type G43.909 346.90    2. Attention deficit disorder (ADD) without hyperactivity F98.8 314.00 methylphenidate HCl (RITALIN) 10 mg tablet      methylphenidate HCl (RITALIN) 10 mg tablet      methylphenidate HCl (RITALIN) 10 mg tablet   3. Allergic rhinitis, unspecified seasonality, unspecified trigger J30.9 477.9 montelukast (SINGULAIR) 10 mg tablet   4. Nausea R11.0 787.02 promethazine (PHENERGAN) 25 mg tablet     Follow-up and Dispositions    · Return in about 3 months (around 7/29/2019) for ADD. Pt expressed understanding of visit summary and plans for any follow ups or referrals as well as any medications prescribed.

## 2019-04-29 NOTE — PATIENT INSTRUCTIONS

## 2019-04-29 NOTE — PROGRESS NOTES
Chief Complaint   Patient presents with    Medication Refill     Ritalin, phenergan and butalbitol.  Allergies     Takiing Xyzal, makes him too tired. 1. Have you been to the ER, urgent care clinic since your last visit? Hospitalized since your last visit? No    2. Have you seen or consulted any other health care providers outside of the [address] [place] [address] since your last visit? Include any pap smears or colon screening.  Yes, Allergist

## 2019-04-30 LAB
A-G RATIO,AGRAT: 2.5 RATIO (ref 1.1–2.6)
ALBUMIN SERPL-MCNC: 4.9 G/DL (ref 3.5–5)
ALP SERPL-CCNC: 79 U/L (ref 25–115)
ALT SERPL-CCNC: 35 U/L (ref 5–40)
ANION GAP SERPL CALC-SCNC: 17 MMOL/L
AST SERPL W P-5'-P-CCNC: 21 U/L (ref 10–37)
AVG GLU, 10930: 157 MG/DL (ref 91–123)
BILIRUB SERPL-MCNC: 0.4 MG/DL (ref 0.2–1.2)
BUN SERPL-MCNC: 10 MG/DL (ref 6–22)
CALCIUM SERPL-MCNC: 9.4 MG/DL (ref 8.4–10.4)
CHLORIDE SERPL-SCNC: 97 MMOL/L (ref 98–110)
CHOLEST SERPL-MCNC: 202 MG/DL (ref 110–200)
CO2 SERPL-SCNC: 26 MMOL/L (ref 20–32)
CREAT SERPL-MCNC: 0.8 MG/DL (ref 0.5–1.2)
ERYTHROCYTE [DISTWIDTH] IN BLOOD BY AUTOMATED COUNT: 13.4 % (ref 10–15.5)
GFRAA, 66117: >60
GFRNA, 66118: >60
GLOBULIN,GLOB: 2 G/DL (ref 2–4)
GLUCOSE SERPL-MCNC: 175 MG/DL (ref 70–99)
HBA1C MFR BLD HPLC: 7.1 % (ref 4.8–5.9)
HCT VFR BLD AUTO: 46.9 % (ref 36.6–51.9)
HDLC SERPL-MCNC: 27 MG/DL (ref 40–59)
HDLC SERPL-MCNC: 7.5 MG/DL (ref 0–5)
HGB BLD-MCNC: 14.6 G/DL (ref 13.2–17.3)
LDL, DIRECT,DLDL: 104 MG/DL (ref 50–99)
LDLC SERPL CALC-MCNC: ABNORMAL MG/DL (ref 50–99)
MCH RBC QN AUTO: 28 PG (ref 26–34)
MCHC RBC AUTO-ENTMCNC: 31 G/DL (ref 31–36)
MCV RBC AUTO: 89 FL (ref 80–95)
PLATELET # BLD AUTO: 272 K/UL (ref 140–440)
PMV BLD AUTO: 10.6 FL (ref 9–13)
POTASSIUM SERPL-SCNC: 4.1 MMOL/L (ref 3.5–5.5)
PROT SERPL-MCNC: 6.9 G/DL (ref 6.4–8.3)
RBC # BLD AUTO: 5.27 M/UL (ref 3.8–5.8)
SODIUM SERPL-SCNC: 140 MMOL/L (ref 133–145)
TRIGL SERPL-MCNC: 442 MG/DL (ref 40–149)
VLDLC SERPL CALC-MCNC: 88 MG/DL (ref 8–30)
WBC # BLD AUTO: 7.5 K/UL (ref 4–11)

## 2019-05-01 ENCOUNTER — TELEPHONE (OUTPATIENT)
Dept: INTERNAL MEDICINE CLINIC | Age: 39
End: 2019-05-01

## 2019-05-01 DIAGNOSIS — G43.909 MIGRAINE WITHOUT STATUS MIGRAINOSUS, NOT INTRACTABLE, UNSPECIFIED MIGRAINE TYPE: ICD-10-CM

## 2019-05-01 DIAGNOSIS — E11.9 TYPE 2 DIABETES MELLITUS WITHOUT COMPLICATION, WITHOUT LONG-TERM CURRENT USE OF INSULIN (HCC): Primary | ICD-10-CM

## 2019-05-01 DIAGNOSIS — I10 ESSENTIAL HYPERTENSION: ICD-10-CM

## 2019-05-02 RX ORDER — METFORMIN HYDROCHLORIDE 500 MG/1
500 TABLET, EXTENDED RELEASE ORAL
Qty: 30 TAB | Refills: 3 | Status: SHIPPED | OUTPATIENT
Start: 2019-05-02 | End: 2019-08-21 | Stop reason: ALTCHOICE

## 2019-05-02 RX ORDER — LOSARTAN POTASSIUM 100 MG/1
TABLET ORAL
Qty: 30 TAB | Refills: 5 | Status: SHIPPED | OUTPATIENT
Start: 2019-05-02 | End: 2019-10-17 | Stop reason: SDUPTHER

## 2019-05-02 RX ORDER — BUTALBITAL AND ACETAMINOPHEN 325; 50 MG/1; MG/1
1 TABLET ORAL
Qty: 30 TAB | Refills: 1 | Status: SHIPPED | OUTPATIENT
Start: 2019-05-02

## 2019-05-02 NOTE — TELEPHONE ENCOUNTER
Please advise pt his A1c was 7.1 which means he is diabetic. His triglycerides were 442 which is very high (should be <150). I would like him to start metformin for the diabetes which I will send to his pharmacy. He should also start fish oil OTC 2000mg twice a day and he should work on diet and lifestyle modifications. I would like to recheck labs in 3 months.

## 2019-05-02 NOTE — TELEPHONE ENCOUNTER
Pt made aware of results. Can you send the Metformin to [facility] on [address]. Also he needs the refills for Losartan and Butalbital sent as well.

## 2019-05-16 DIAGNOSIS — K21.9 GASTROESOPHAGEAL REFLUX DISEASE WITHOUT ESOPHAGITIS: ICD-10-CM

## 2019-05-16 RX ORDER — OMEPRAZOLE 40 MG/1
CAPSULE, DELAYED RELEASE ORAL
Qty: 30 CAP | Refills: 4 | Status: SHIPPED | OUTPATIENT
Start: 2019-05-16 | End: 2019-09-23 | Stop reason: SDUPTHER

## 2019-06-13 ENCOUNTER — OFFICE VISIT (OUTPATIENT)
Dept: INTERNAL MEDICINE CLINIC | Age: 39
End: 2019-06-13

## 2019-06-13 VITALS
HEIGHT: 70 IN | HEART RATE: 91 BPM | DIASTOLIC BLOOD PRESSURE: 98 MMHG | TEMPERATURE: 97.9 F | WEIGHT: 290 LBS | RESPIRATION RATE: 18 BRPM | OXYGEN SATURATION: 95 % | BODY MASS INDEX: 41.52 KG/M2 | SYSTOLIC BLOOD PRESSURE: 138 MMHG

## 2019-06-13 DIAGNOSIS — J30.2 SEASONAL ALLERGIC RHINITIS, UNSPECIFIED TRIGGER: Primary | ICD-10-CM

## 2019-06-13 DIAGNOSIS — J01.41 ACUTE RECURRENT PANSINUSITIS: ICD-10-CM

## 2019-06-13 DIAGNOSIS — R05.9 COUGH: ICD-10-CM

## 2019-06-13 RX ORDER — PROMETHAZINE HYDROCHLORIDE AND CODEINE PHOSPHATE 6.25; 1 MG/5ML; MG/5ML
1 SOLUTION ORAL
Qty: 180 ML | Refills: 0 | Status: SHIPPED | OUTPATIENT
Start: 2019-06-13 | End: 2019-06-20

## 2019-06-13 RX ORDER — CEFDINIR 300 MG/1
300 CAPSULE ORAL 2 TIMES DAILY
Qty: 20 CAP | Refills: 0 | Status: SHIPPED | OUTPATIENT
Start: 2019-06-13 | End: 2019-06-23

## 2019-06-13 NOTE — PROGRESS NOTES
Chief Complaint   Patient presents with    Allergies     Requesting referral to Allergist, Singulair not helping. 1. Have you been to the ER, urgent care clinic since your last visit? Hospitalized since your last visit? No    2. Have you seen or consulted any other health care providers outside of the [address] [place] [address] since your last visit? Include any pap smears or colon screening.  No

## 2019-06-13 NOTE — PROGRESS NOTES
HISTORY OF PRESENT ILLNESS  Kelly Xie is a 42 y.o. male. HPI   Pt is here for a referral to an allergist. He has been having severe allergies since Jan and has been taking Singulair, Claritin, Flonase, and benadryl at night with some relief but not much. He feels it is worsening and the past few weeks he has had increasing sinus pressure, dry cough, and malaise. He denies fever, chills, CP, SOB, wheezing, ST, otalgia, dizziness, and N/v/D. No Known Allergies    Current Outpatient Medications   Medication Sig    cefdinir (OMNICEF) 300 mg capsule Take 1 Cap by mouth two (2) times a day for 10 days.  promethazine-codeine (PHENERGAN WITH CODEINE) 6.25-10 mg/5 mL syrup Take 5 mL by mouth every six (6) hours as needed for Cough for up to 7 days. Max Daily Amount: 20 mL.  omeprazole (PRILOSEC) 40 mg capsule TAKE 1 CAPSULE BY MOUTH EVERY DAY    butalbital-acetaminophen (PHRENILIN)  mg tablet Take 1 Tab by mouth every six (6) hours as needed for Pain.  losartan (COZAAR) 100 mg tablet TAKE 1 TABLET BY MOUTH EVERY DAY    metFORMIN ER (GLUCOPHAGE XR) 500 mg tablet Take 1 Tab by mouth daily (with dinner). Indications: type 2 diabetes mellitus    [START ON 6/24/2019] methylphenidate HCl (RITALIN) 10 mg tablet Take 1 Tab by mouth three (3) times daily. Max Daily Amount: 30 mg.    montelukast (SINGULAIR) 10 mg tablet Take 1 Tab by mouth daily.  promethazine (PHENERGAN) 25 mg tablet Take 1 Tab by mouth every six (6) hours as needed for Nausea.  methylphenidate HCl (RITALIN) 10 mg tablet Take 1 Tab by mouth three (3) times daily. Max Daily Amount: 30 mg.    methylphenidate HCl (RITALIN) 10 mg tablet Take 1 Tab by mouth three (3) times daily. Max Daily Amount: 30 mg. No current facility-administered medications for this visit. Review of Systems   Constitutional: Positive for malaise/fatigue. Negative for chills and fever. HENT: Positive for congestion, sinus pain and sore throat. Negative for ear pain. Respiratory: Positive for cough. Negative for sputum production, shortness of breath and wheezing. Cardiovascular: Negative. Negative for chest pain, palpitations and leg swelling. Gastrointestinal: Negative for abdominal pain, nausea and vomiting. Genitourinary: Negative. Negative for dysuria, frequency, hematuria and urgency. Musculoskeletal: Negative. Negative for myalgias. Skin: Negative. Negative for itching and rash. Neurological: Negative. Negative for dizziness and headaches. Endo/Heme/Allergies: Positive for environmental allergies. Psychiatric/Behavioral: Negative. Visit Vitals  BP (!) 138/98 (BP 1 Location: Left arm, BP Patient Position: Sitting)   Pulse 91   Temp 97.9 °F (36.6 °C) (Oral)   Resp 18   Ht 5' 10\" (1.778 m)   Wt 290 lb (131.5 kg)   SpO2 95%   BMI 41.61 kg/m²       Physical Exam   Constitutional: He is oriented to person, place, and time. He appears well-developed and well-nourished. No distress. HENT:   Head: Normocephalic and atraumatic. Right Ear: External ear and ear canal normal. A middle ear effusion is present. Left Ear: External ear and ear canal normal. A middle ear effusion is present. Nose: Mucosal edema and rhinorrhea present. Right sinus exhibits frontal sinus tenderness. Right sinus exhibits no maxillary sinus tenderness. Left sinus exhibits frontal sinus tenderness. Left sinus exhibits no maxillary sinus tenderness. Mouth/Throat: Uvula is midline, oropharynx is clear and moist and mucous membranes are normal.   Cardiovascular: Normal rate, regular rhythm and normal heart sounds. Exam reveals no gallop and no friction rub. No murmur heard. Pulmonary/Chest: Effort normal and breath sounds normal. No respiratory distress. He has no wheezes. He has no rales. Neurological: He is alert and oriented to person, place, and time. Skin: He is not diaphoretic. Psychiatric: He has a normal mood and affect.  His behavior is normal.       ASSESSMENT and PLAN    ICD-10-CM ICD-9-CM    1. Seasonal allergic rhinitis, unspecified trigger J30.2 477.9 REFERRAL TO ALLERGY   2. Cough R05 786.2 promethazine-codeine (PHENERGAN WITH CODEINE) 6.25-10 mg/5 mL syrup   3. Acute recurrent pansinusitis J01.41 461.8 cefdinir (OMNICEF) 300 mg capsule     Follow-up and Dispositions    · Return if symptoms worsen or fail to improve. Pt expressed understanding of visit summary and plans for any follow ups or referrals as well as any medications prescribed.

## 2019-06-13 NOTE — PATIENT INSTRUCTIONS
Allergies: Care Instructions  Your Care Instructions    Allergies occur when your body's defense system (immune system) overreacts to certain substances. The immune system treats a harmless substance as if it were a harmful germ or virus. Many things can cause this overreaction, including pollens, medicine, food, dust, animal dander, and mold. Allergies can be mild or severe. Mild allergies can be managed with home treatment. But medicine may be needed to prevent problems. Managing your allergies is an important part of staying healthy. Your doctor may suggest that you have allergy testing to help find out what is causing your allergies. When you know what things trigger your symptoms, you can avoid them. This can prevent allergy symptoms and other health problems. For severe allergies that cause reactions that affect your whole body (anaphylactic reactions), your doctor may prescribe a shot of epinephrine to carry with you in case you have a severe reaction. Learn how to give yourself the shot and keep it with you at all times. Make sure it is not . Follow-up care is a key part of your treatment and safety. Be sure to make and go to all appointments, and call your doctor if you are having problems. It's also a good idea to know your test results and keep a list of the medicines you take. How can you care for yourself at home? · If you have been told by your doctor that dust or dust mites are causing your allergy, decrease the dust around your bed:  ? Wash sheets, pillowcases, and other bedding in hot water every week. ? Use dust-proof covers for pillows, duvets, and mattresses. Avoid plastic covers because they tear easily and do not \"breathe. \" Wash as instructed on the label. ? Do not use any blankets and pillows that you do not need. ? Use blankets that you can wash in your washing machine. ? Consider removing drapes and carpets, which attract and hold dust, from your bedroom.   · If you are allergic to house dust and mites, do not use home humidifiers. Your doctor can suggest ways you can control dust and mites. · Look for signs of cockroaches. Cockroaches cause allergic reactions. Use cockroach baits to get rid of them. Then, clean your home well. Cockroaches like areas where grocery bags, newspapers, empty bottles, or cardboard boxes are stored. Do not keep these inside your home, and keep trash and food containers sealed. Seal off any spots where cockroaches might enter your home. · If you are allergic to mold, get rid of furniture, rugs, and drapes that smell musty. Check for mold in the bathroom. · If you are allergic to outdoor pollen or mold spores, use air-conditioning. Change or clean all filters every month. Keep windows closed. · If you are allergic to pollen, stay inside when pollen counts are high. Use a vacuum  with a HEPA filter or a double-thickness filter at least two times each week. · Stay inside when air pollution is bad. Avoid paint fumes, perfumes, and other strong odors. · Avoid conditions that make your allergies worse. Stay away from smoke. Do not smoke or let anyone else smoke in your house. Do not use fireplaces or wood-burning stoves. · If you are allergic to your pets, change the air filter in your furnace every month. Use high-efficiency filters. · If you are allergic to pet dander, keep pets outside or out of your bedroom. Old carpet and cloth furniture can hold a lot of animal dander. You may need to replace them. When should you call for help? Give an epinephrine shot if:    · You think you are having a severe allergic reaction.     · You have symptoms in more than one body area, such as mild nausea and an itchy mouth.    After giving an epinephrine shot call 911, even if you feel better.   Call 911 if:    · You have symptoms of a severe allergic reaction. These may include:  ? Sudden raised, red areas (hives) all over your body. ?  Swelling of the throat, mouth, lips, or tongue. ? Trouble breathing. ? Passing out (losing consciousness). Or you may feel very lightheaded or suddenly feel weak, confused, or restless.     · You have been given an epinephrine shot, even if you feel better.    Call your doctor now or seek immediate medical care if:    · You have symptoms of an allergic reaction, such as:  ? A rash or hives (raised, red areas on the skin). ? Itching. ? Swelling. ? Belly pain, nausea, or vomiting.    Watch closely for changes in your health, and be sure to contact your doctor if:    · You do not get better as expected. Where can you learn more? Go to http://tay-efrain.info/. Enter R999 in the search box to learn more about \"Allergies: Care Instructions. \"  Current as of: June 27, 2018  Content Version: 11.9  © 4276-5507 Healthwise, Incorporated. Care instructions adapted under license by [employer] (which disclaims liability or warranty for this information). If you have questions about a medical condition or this instruction, always ask your healthcare professional. [address] any warranty or liability for your use of this information.

## 2019-08-21 ENCOUNTER — OFFICE VISIT (OUTPATIENT)
Dept: INTERNAL MEDICINE CLINIC | Age: 39
End: 2019-08-21

## 2019-08-21 VITALS
TEMPERATURE: 97.8 F | SYSTOLIC BLOOD PRESSURE: 128 MMHG | HEART RATE: 68 BPM | HEIGHT: 70 IN | WEIGHT: 265 LBS | BODY MASS INDEX: 37.94 KG/M2 | RESPIRATION RATE: 16 BRPM | OXYGEN SATURATION: 96 % | DIASTOLIC BLOOD PRESSURE: 79 MMHG

## 2019-08-21 DIAGNOSIS — Z09 HOSPITAL DISCHARGE FOLLOW-UP: ICD-10-CM

## 2019-08-21 DIAGNOSIS — E11.9 TYPE 2 DIABETES MELLITUS WITHOUT COMPLICATION, WITHOUT LONG-TERM CURRENT USE OF INSULIN (HCC): Primary | ICD-10-CM

## 2019-08-21 DIAGNOSIS — I10 ESSENTIAL HYPERTENSION: ICD-10-CM

## 2019-08-21 DIAGNOSIS — E11.10 TYPE 2 DIABETES MELLITUS WITH KETOACIDOSIS WITHOUT COMA, WITHOUT LONG-TERM CURRENT USE OF INSULIN (HCC): ICD-10-CM

## 2019-08-21 DIAGNOSIS — E66.01 OBESITY, MORBID (HCC): ICD-10-CM

## 2019-08-21 RX ORDER — FENOFIBRATE 200 MG/1
CAPSULE ORAL
COMMUNITY
End: 2019-09-04 | Stop reason: SDUPTHER

## 2019-08-21 RX ORDER — METFORMIN HYDROCHLORIDE 1000 MG/1
1000 TABLET ORAL 2 TIMES DAILY WITH MEALS
COMMUNITY
End: 2019-08-21 | Stop reason: ALTCHOICE

## 2019-08-21 NOTE — PROGRESS NOTES
Chief Complaint   Patient presents with   [facility] ED Follow-up     ER visit 8/10 for Pancreatitis, admitted for 4 days. Started on Januvia and increased Metforming to 1000mg Bid. Blood sugar was at 500 upon admission. Last check it was 164.      1. Have you been to the ER, urgent care clinic since your last visit? Hospitalized since your last visit? Yes Where: ER and admission 8/10    2. Have you seen or consulted any other health care providers outside of the [address] [place] [address] since your last visit? Include any pap smears or colon screening.  No     3 most recent PHQ Screens 8/21/2019   Little interest or pleasure in doing things Not at all   Feeling down, depressed, irritable, or hopeless Not at all   Total Score PHQ 2 0

## 2019-08-26 ENCOUNTER — TELEPHONE (OUTPATIENT)
Dept: INTERNAL MEDICINE CLINIC | Age: 39
End: 2019-08-26

## 2019-08-26 DIAGNOSIS — E11.9 TYPE 2 DIABETES MELLITUS WITHOUT COMPLICATION, WITHOUT LONG-TERM CURRENT USE OF INSULIN (HCC): Primary | ICD-10-CM

## 2019-08-26 NOTE — TELEPHONE ENCOUNTER
Please advise pt insurance will not cover O[place] [place] but will cover invokamet which is in the same class.  Will send new rx

## 2019-09-01 PROBLEM — E11.9 TYPE 2 DIABETES MELLITUS WITHOUT COMPLICATION, WITHOUT LONG-TERM CURRENT USE OF INSULIN (HCC): Status: ACTIVE | Noted: 2019-09-01

## 2019-09-01 PROBLEM — E11.10 TYPE 2 DIABETES MELLITUS WITH KETOACIDOSIS WITHOUT COMA, WITHOUT LONG-TERM CURRENT USE OF INSULIN (HCC): Status: ACTIVE | Noted: 2019-09-01

## 2019-09-01 NOTE — PATIENT INSTRUCTIONS
Diabetic Ketoacidosis (DKA): Care Instructions  Your Care Instructions  Diabetic ketoacidosis (DKA) happens when the body does not have enough insulin and can't get the sugar it needs for energy. When the body can't use sugar for energy, it starts to use fat for energy. This process makes fatty acids called ketones. The ketones build up in the blood and change the chemical balance in your body. This problem can be very dangerous and needs to be treated. Without treatment, it can lead to a coma or death. DKA occurs most often in people with type 1 diabetes. But people with type 2 diabetes also can get it. DKA can be caused by many things. It can happen if you don't take enough insulin. It can also happen if you have an infection or illness like the flu. Sometimes it happens if you are very dehydrated. DKA can only be treated with insulin and fluids. These are often given in a vein (IV). Follow-up care is a key part of your treatment and safety. Be sure to make and go to all appointments, and call your doctor if you are having problems. It's also a good idea to know your test results and keep a list of the medicines you take. How can you care for yourself at home? To reduce your chance of ketoacidosis:  · Take your insulin and other diabetes medicines on time and in the right dose. ? If an infection caused your DKA and your doctor prescribed antibiotics, take them as directed. Do not stop taking them just because you feel better. You need to take the full course of antibiotics. · Test your blood sugar before meals and at bedtime or as often as your doctor advises. This is the best way to know when your blood sugar is high so you can treat it early. Watching for symptoms is not as helpful. This is because you may not have symptoms until your blood sugar is very high. Or you may not notice them. · Teach others at work and at home how to check your blood sugar.  Make sure that someone else knows how do it in case you can't. · Wear or carry medical identification at all times. This is very important in case you are too sick or injured to speak for yourself. · Talk to your doctor about when you can start to exercise again. · Eat regular meals that spread your calories and carbohydrate throughout the day. This will help keep your blood sugar steady. · When you are sick:  ? Take your insulin and diabetes medicines. This is important even if you are vomiting and having trouble eating or drinking. Your blood sugar may go up because you are sick. If you are eating less than normal, you may need to change your dose of insulin. Talk with your doctor about a plan when you are well. Then you will know what to do when you are sick. ? Drink extra fluids to prevent dehydration. These include water, broth, and sugar-free drinks. If you don't drink enough, the insulin from your shot may not get into your blood. So your blood sugar may go up. ? Try to eat as you normally do, with a focus on healthy food choices. ? Check your blood sugar at least every 3 to 4 hours. Check it more often if it's rising fast. If your doctor has told you to take an extra insulin dose for high blood sugar levels (for example, above 240 mg/dL) be sure to take the right amount. If you're not sure how much to take, call your doctor. ? Check your temperature and pulse often. If your temperature goes up, call your doctor. You may be getting worse. ? If you take insulin, check your urine or blood for ketones, especially when you have high blood sugar (for example, above 240 mg/dL). Call your doctor if your ketone level is moderate or high. If you know your blood sugar is high, treat it before it gets worse. · If you missed your usual dose of insulin or other diabetes medicine, take the missed dose or take the amount your doctor told you to take if this happens.   · If you and your doctor decide on a dose of extra-fast-acting insulin, give yourself the right dose. If you take insulin and your doctor has not told you how much fast-acting insulin to take based on your blood sugar level, call your doctor. · Drink extra water or sugar-free drinks to prevent dehydration. · Wait 30 minutes after you take extra insulin or missed medicines. Then check your blood sugar again. · If symptoms of high blood sugar get worse or your blood sugar level keeps rising, call your doctor. If you start to feel sleepy or confused, call 911. When should you call for help? Call 911 anytime you think you may need emergency care. For example, call if:    · You passed out (lost consciousness).     · You are confused or cannot think clearly.     · Your blood sugar is very high or very low.    Watch closely for changes in your health, and be sure to contact your doctor if:    · Your blood sugar stays outside the level your doctor set for you.     · You have any problems. Where can you learn more? Go to http://tay-efrain.info/. Darrick Purvis in the search box to learn more about \"Diabetic Ketoacidosis (DKA): Care Instructions. \"  Current as of: July 25, 2018  Content Version: 12.1  © 2154-8384 [employer]wise, Incorporated. Care instructions adapted under license by [employer] (which disclaims liability or warranty for this information). If you have questions about a medical condition or this instruction, always ask your healthcare professional. [address] any warranty or liability for your use of this information.

## 2019-09-01 NOTE — PROGRESS NOTES
HISTORY OF PRESENT ILLNESS  Pablo Lopez is a 42 y.o. male. HPI   Pt is here for hospital follow up for DKA. He was dx with DM2 4 months ago and his A1c was 7.1. He was started on metformin and given diet info. He states he has been eating bad due to stress and work. He went to the ER on 8/10 and dx with pancreatitis and DKA. He was admitted for 4 days. His A1c was up to 13. 4. He was started on Januvia in addition to metformin. He is feeling better and is serious about controlling his diet and increasing his activity . No Known Allergies    Current Outpatient Medications   Medication Sig    SITagliptin (JANUVIA) 100 mg tablet Take 100 mg by mouth daily.  fenofibrate micronized (LOFIBRA) 200 mg capsule Take  by mouth every morning.  omega 3-dha-epa-fish oil (FISH OIL) 100-160-1,000 mg cap Take  by mouth.  omeprazole (PRILOSEC) 40 mg capsule TAKE 1 CAPSULE BY MOUTH EVERY DAY    losartan (COZAAR) 100 mg tablet TAKE 1 TABLET BY MOUTH EVERY DAY    methylphenidate HCl (RITALIN) 10 mg tablet Take 1 Tab by mouth three (3) times daily. Max Daily Amount: 30 mg.    montelukast (SINGULAIR) 10 mg tablet Take 1 Tab by mouth daily.  canagliflozin-metformin (INVOKAMET XR) 150-1,000 mg TBph Take 1 Tab by mouth daily.  butalbital-acetaminophen (PHRENILIN)  mg tablet Take 1 Tab by mouth every six (6) hours as needed for Pain.  methylphenidate HCl (RITALIN) 10 mg tablet Take 1 Tab by mouth three (3) times daily. Max Daily Amount: 30 mg.    methylphenidate HCl (RITALIN) 10 mg tablet Take 1 Tab by mouth three (3) times daily. Max Daily Amount: 30 mg.  promethazine (PHENERGAN) 25 mg tablet Take 1 Tab by mouth every six (6) hours as needed for Nausea. No current facility-administered medications for this visit. Review of Systems   Constitutional: Negative. Negative for chills, fever and malaise/fatigue. HENT: Negative. Negative for congestion, ear pain, sore throat and tinnitus.     Eyes: Negative. Negative for blurred vision, double vision and photophobia. Respiratory: Negative. Negative for cough, shortness of breath and wheezing. Cardiovascular: Negative. Negative for chest pain, palpitations and leg swelling. Gastrointestinal: Positive for heartburn (controlled w meds). Negative for abdominal pain, nausea and vomiting. Genitourinary: Negative. Negative for dysuria, frequency, hematuria and urgency. Musculoskeletal: Negative. Negative for back pain, joint pain, myalgias and neck pain. Skin: Negative. Negative for itching and rash. Neurological: Negative. Negative for dizziness, tingling, tremors and headaches. Endo/Heme/Allergies: Positive for environmental allergies. Psychiatric/Behavioral: Negative. Negative for depression and memory loss. The patient is not nervous/anxious and does not have insomnia. Visit Vitals  /79 (BP 1 Location: Left arm, BP Patient Position: Sitting)   Pulse 68   Temp 97.8 °F (36.6 °C) (Oral)   Resp 16   Ht 5' 10\" (1.778 m)   Wt 265 lb (120.2 kg)   SpO2 96%   BMI 38.02 kg/m²       Physical Exam   Constitutional: He is oriented to person, place, and time. He appears well-developed and well-nourished. No distress. Pt is obese   HENT:   Head: Normocephalic and atraumatic. Nose: Mucosal edema present. No rhinorrhea. Right sinus exhibits no maxillary sinus tenderness and no frontal sinus tenderness. Left sinus exhibits no maxillary sinus tenderness and no frontal sinus tenderness. Cardiovascular: Normal rate, regular rhythm and normal heart sounds. Exam reveals no gallop and no friction rub. No murmur heard. Pulmonary/Chest: Effort normal and breath sounds normal. No respiratory distress. He has no wheezes. He has no rales. Neurological: He is alert and oriented to person, place, and time. Skin: Skin is warm and dry. He is not diaphoretic. Psychiatric: He has a normal mood and affect.  His behavior is normal.       ASSESSMENT and PLAN    ICD-10-CM ICD-9-CM    1. Type 2 diabetes mellitus without complication, without long-term current use of insulin (HCC) E11.9 250.00 DISCONTINUED: dapagliflozin-metformin (XIGDUO XR) 10-1,000 mg TBph   2. Type 2 diabetes mellitus with ketoacidosis without coma, without long-term current use of insulin (HCC) E11.10 250.10    3. Hospital discharge follow-up Z09 V67.59    4. Essential hypertension I10 401.9    5. Obesity, morbid ([facility] [address] E66.01 278.01      Follow-up and Dispositions    · Return in about 3 months (around 11/21/2019) for diabetes. Pt expressed understanding of visit summary and plans for any follow ups or referrals as well as any medications prescribed.

## 2019-09-04 RX ORDER — FENOFIBRATE 200 MG/1
200 CAPSULE ORAL
Qty: 30 CAP | Refills: 3 | Status: SHIPPED | OUTPATIENT
Start: 2019-09-04 | End: 2020-01-16

## 2019-09-23 DIAGNOSIS — K21.9 GASTROESOPHAGEAL REFLUX DISEASE WITHOUT ESOPHAGITIS: ICD-10-CM

## 2019-09-25 RX ORDER — OMEPRAZOLE 40 MG/1
CAPSULE, DELAYED RELEASE ORAL
Qty: 90 CAP | Refills: 1 | Status: SHIPPED | OUTPATIENT
Start: 2019-09-25

## 2019-10-17 DIAGNOSIS — I10 ESSENTIAL HYPERTENSION: ICD-10-CM

## 2019-10-21 RX ORDER — LOSARTAN POTASSIUM 100 MG/1
TABLET ORAL
Qty: 30 TAB | Refills: 5 | Status: SHIPPED | OUTPATIENT
Start: 2019-10-21

## 2019-10-23 ENCOUNTER — PATIENT MESSAGE (OUTPATIENT)
Dept: INTERNAL MEDICINE CLINIC | Age: 39
End: 2019-10-23

## 2019-10-24 ENCOUNTER — TELEPHONE (OUTPATIENT)
Dept: INTERNAL MEDICINE CLINIC | Age: 39
End: 2019-10-24

## 2019-10-24 DIAGNOSIS — E11.9 TYPE 2 DIABETES MELLITUS WITHOUT COMPLICATION, WITHOUT LONG-TERM CURRENT USE OF INSULIN (HCC): Primary | ICD-10-CM

## 2019-10-24 NOTE — TELEPHONE ENCOUNTER
Pt got new insurance, [employer], and they denied Invokamet. Can you send in something else? Looked online at formulary for [place] and there is Romero Fernandes and Juan. Plenty of others if you don't want to try one of these.

## 2019-12-26 ENCOUNTER — TELEPHONE (OUTPATIENT)
Dept: INTERNAL MEDICINE CLINIC | Age: 39
End: 2019-12-26

## 2019-12-26 DIAGNOSIS — R05.9 COUGH: Primary | ICD-10-CM

## 2019-12-26 RX ORDER — PROMETHAZINE HYDROCHLORIDE AND CODEINE PHOSPHATE 6.25; 1 MG/5ML; MG/5ML
1 SOLUTION ORAL
Qty: 180 ML | Refills: 0 | Status: SHIPPED | OUTPATIENT
Start: 2019-12-26 | End: 2020-01-04

## 2019-12-26 NOTE — TELEPHONE ENCOUNTER
Pt stopped by the office asking to be seen for a cough, told the office is closed and directed him to urgent care. Pt asked if he could get  a refill on Promethazine with codeine cough syrup that he had back in June. Denies fever, just coughing when he lays down and he cannot sleep.     [facility] on [facility]

## 2020-03-18 RX ORDER — FENOFIBRATE 200 MG/1
CAPSULE ORAL
Qty: 30 CAP | Refills: 0 | Status: SHIPPED | OUTPATIENT
Start: 2020-03-18

## 2023-08-09 NOTE — ADDENDUM NOTE
Addended by: Troy Stephen on: 4/29/2019 09:03 AM     Modules accepted: Johanny [___ Month(s)] : in [unfilled] month(s) [FreeTextEntry1] : The patient was to of the importance of seeing neurology for his leg issues .  Advised follow up wiht PCP about acromegaly in the past  Continue current medication for now  Follow up in 3-4 months .  I have given him the name  of   at [facility].